# Patient Record
Sex: FEMALE | Race: WHITE | NOT HISPANIC OR LATINO | Employment: UNEMPLOYED | URBAN - NONMETROPOLITAN AREA
[De-identification: names, ages, dates, MRNs, and addresses within clinical notes are randomized per-mention and may not be internally consistent; named-entity substitution may affect disease eponyms.]

---

## 2023-01-10 ENCOUNTER — OFFICE VISIT (OUTPATIENT)
Dept: URGENT CARE | Facility: CLINIC | Age: 63
End: 2023-01-10

## 2023-01-10 VITALS
SYSTOLIC BLOOD PRESSURE: 126 MMHG | HEART RATE: 93 BPM | OXYGEN SATURATION: 97 % | RESPIRATION RATE: 16 BRPM | TEMPERATURE: 97.9 F | DIASTOLIC BLOOD PRESSURE: 62 MMHG

## 2023-01-10 DIAGNOSIS — R80.9 PROTEINURIA, UNSPECIFIED TYPE: Primary | ICD-10-CM

## 2023-01-10 LAB
SL AMB  POCT GLUCOSE, UA: NEGATIVE
SL AMB LEUKOCYTE ESTERASE,UA: NEGATIVE
SL AMB POCT BILIRUBIN,UA: NEGATIVE
SL AMB POCT BLOOD,UA: NORMAL
SL AMB POCT CLARITY,UA: CLEAR
SL AMB POCT COLOR,UA: NORMAL
SL AMB POCT KETONES,UA: NEGATIVE
SL AMB POCT NITRITE,UA: NEGATIVE
SL AMB POCT PH,UA: 6
SL AMB POCT SPECIFIC GRAVITY,UA: 1.01
SL AMB POCT URINE PROTEIN: 300
SL AMB POCT UROBILINOGEN: 0.2

## 2023-01-10 RX ORDER — ALBUTEROL SULFATE 90 UG/1
2 AEROSOL, METERED RESPIRATORY (INHALATION) EVERY 4 HOURS PRN
COMMUNITY
End: 2023-01-23 | Stop reason: SDUPTHER

## 2023-01-10 RX ORDER — PRAVASTATIN SODIUM 40 MG
40 TABLET ORAL DAILY
COMMUNITY
End: 2023-01-23 | Stop reason: SDUPTHER

## 2023-01-10 RX ORDER — CIPROFLOXACIN 500 MG/1
500 TABLET, FILM COATED ORAL EVERY 12 HOURS SCHEDULED
Qty: 10 TABLET | Refills: 0 | Status: SHIPPED | OUTPATIENT
Start: 2023-01-10 | End: 2023-01-15

## 2023-01-10 RX ORDER — CLONIDINE HYDROCHLORIDE 0.1 MG/1
TABLET ORAL
COMMUNITY
Start: 2022-11-04

## 2023-01-10 RX ORDER — ASPIRIN 81 MG/1
81 TABLET ORAL DAILY
COMMUNITY

## 2023-01-10 RX ORDER — DILTIAZEM HYDROCHLORIDE 240 MG/1
240 CAPSULE, COATED, EXTENDED RELEASE ORAL DAILY
COMMUNITY
Start: 2023-01-08

## 2023-01-10 NOTE — PROGRESS NOTES
[facility]        NAME: Chris Hand is a 60 y o  female  : 1960    MRN: [id number]  DATE: January 10, 2023  TIME: 7:37 PM    Assessment and Plan   Proteinuria, unspecified type [R80 9]  1  Proteinuria, unspecified type  POCT urine dip    Urine culture    ciprofloxacin (CIPRO) 500 mg tablet    Ambulatory Referral to Urology        Urine dip negative for infection- will send culture  Given proteinuria will cover with cipro to cover for pyleonephritis given long standing UTI symptoms and refer to urology  Patient Instructions   No sign of infection on the urine dip today  You do however have a lot of protein and blood in the urine that requires continued follow up  Therefore we will cover you for pyelonephritis with antibiotics  You have been prescribed an antibiotic  Take antibiotic as directed for the full duration  Do not stop the antibiotics just because you are feeling better  Side effects of antibiotics include diarrhea  Eat yogurt or take a probiotic or acidophilus tablet while taking this medication to help prevent diarrhea and replenish good gut bacteria  Follow up with PCP in 3-5 days  Proceed to  ER if symptoms worsen  Chief Complaint     Chief Complaint   Patient presents with   • Possible UTI     Started 1 month ago, urine dysuria         History of Present Illness       Patient is a 60year old female who presents to the office today for burning with urination and increased frequency  States symptoms have gotten worse over past month  States feels nauseated at times and fatigued  Review of Systems   Review of Systems   Constitutional: Negative for chills and fever  Genitourinary: Positive for dysuria, frequency and hematuria  Negative for decreased urine volume, dyspareunia, enuresis, menstrual problem, pelvic pain, urgency, vaginal bleeding and vaginal discharge  Skin: Negative for rash  All other systems reviewed and are negative          Current Medications       Current Outpatient Medications:   •  albuterol (PROVENTIL HFA,VENTOLIN HFA) 90 mcg/act inhaler, Inhale 2 puffs every 6 (six) hours as needed for wheezing, Disp: , Rfl:   •  aspirin (ECOTRIN LOW STRENGTH) 81 mg EC tablet, Take 81 mg by mouth daily, Disp: , Rfl:   •  ciprofloxacin (CIPRO) 500 mg tablet, Take 1 tablet (500 mg total) by mouth every 12 (twelve) hours for 5 days, Disp: 10 tablet, Rfl: 0  •  cloNIDine (CATAPRES) 0 1 mg tablet, take 1 tablet by mouth if needed for SPB >170, Disp: , Rfl:   •  diltiazem (CARDIZEM CD) 240 mg 24 hr capsule, Take 240 mg by mouth daily, Disp: , Rfl:   •  Multiple Vitamin (Multi-Vitamin) tablet, Take 1 tablet by mouth daily, Disp: , Rfl:   •  pravastatin (PRAVACHOL) 40 mg tablet, Take 40 mg by mouth daily, Disp: , Rfl:     Current Allergies     Allergies as of 01/10/2023 - Reviewed 01/10/2023   Allergen Reaction Noted   • Amoxicillin Rash 01/25/2022   • Shellfish allergy - food allergy Angioedema 01/09/2015   • Lisinopril Cough 01/10/2023            The following portions of the patient's history were reviewed and updated as appropriate: allergies, current medications, past family history, past medical history, past social history, past surgical history and problem list      Past Medical History:   Diagnosis Date   • Chronic kidney disease    • Emphysema of lung ([facility] [address] )    • Hypertension    • Osteopenia    • Rheumatoid arthritis ([facility] [address] )        History reviewed  No pertinent surgical history  History reviewed  No pertinent family history  Medications have been verified  Objective   /62   Pulse 93   Temp 97 9 °F (36 6 °C)   Resp 16   SpO2 97%        Physical Exam     Physical Exam  Vitals and nursing note reviewed  Constitutional:       General: She is not in acute distress  Appearance: Normal appearance  She is normal weight  She is not ill-appearing or toxic-appearing     Cardiovascular:      Rate and Rhythm: Normal rate and regular rhythm  Pulses: Normal pulses  Heart sounds: Normal heart sounds  Pulmonary:      Effort: Pulmonary effort is normal       Breath sounds: Normal breath sounds  Abdominal:      Tenderness: There is no right CVA tenderness or left CVA tenderness  Skin:     General: Skin is warm  Capillary Refill: Capillary refill takes less than 2 seconds  Neurological:      General: No focal deficit present  Mental Status: She is alert and oriented to person, place, and time

## 2023-01-11 NOTE — PATIENT INSTRUCTIONS
No sign of infection on the urine dip today  You do however have a lot of protein and blood in the urine that requires continued follow up  Therefore we will cover you for pyelonephritis with antibiotics  You have been prescribed an antibiotic  Take antibiotic as directed for the full duration  Do not stop the antibiotics just because you are feeling better  Side effects of antibiotics include diarrhea  Eat yogurt or take a probiotic or acidophilus tablet while taking this medication to help prevent diarrhea and replenish good gut bacteria

## 2023-01-12 LAB — BACTERIA UR CULT: ABNORMAL

## 2023-01-13 ENCOUNTER — TELEPHONE (OUTPATIENT)
Dept: URGENT CARE | Facility: CLINIC | Age: 63
End: 2023-01-13

## 2023-01-13 DIAGNOSIS — N30.01 ACUTE CYSTITIS WITH HEMATURIA: Primary | ICD-10-CM

## 2023-01-13 RX ORDER — NITROFURANTOIN 25; 75 MG/1; MG/1
100 CAPSULE ORAL 2 TIMES DAILY
Qty: 10 CAPSULE | Refills: 0 | Status: SHIPPED | OUTPATIENT
Start: 2023-01-13 | End: 2023-01-18

## 2023-01-13 NOTE — TELEPHONE ENCOUNTER
Patient called stating that she received a text message from the pharmacy and her name was spelled incorrectly so she was calling to verify her name was spelled in epic  Her name is spelled correctly through our system as verified by the patient

## 2023-01-13 NOTE — TELEPHONE ENCOUNTER
Patient returned phone call  Did discuss urine culture results as well as sensitivity  Patient verbalized understanding of change in antibiotics and is to follow-up with the urologist as originally planned

## 2023-01-13 NOTE — TELEPHONE ENCOUNTER
Left message for patient to call back regarding test results  Per urine culture patient's bacteria is resistant to Cipro therefore will place on Macrobid    Same sent to pharmacy

## 2023-01-17 ENCOUNTER — TELEPHONE (OUTPATIENT)
Dept: FAMILY MEDICINE CLINIC | Facility: CLINIC | Age: 63
End: 2023-01-17

## 2023-01-17 NOTE — TELEPHONE ENCOUNTER
Patient called inquiring about medical coverage due to recent visit at [facility]  Patient wanted to self pay for appointment but was told she had medical assistance  Patient was on the fence about canceling appointment but told her thaqt we are scheduling into the end of next week and to see if she contact the office tomorrow  Patient does not remember sending in papers for a renewal  Patient is not able to self pay with having that type of insurance   helped verify medical assistance  Also stated that when the panadamenic happened they were auto renewing which could have occurred for her  Called patient back to confirm appointment for Thursday 1/19 at 2:00PM or to cancel  Patient [place] the county office [place] and call us back to see if keeping appointment or not

## 2023-01-23 ENCOUNTER — OFFICE VISIT (OUTPATIENT)
Dept: FAMILY MEDICINE CLINIC | Facility: CLINIC | Age: 63
End: 2023-01-23

## 2023-01-23 VITALS
HEIGHT: 65 IN | TEMPERATURE: 96.1 F | OXYGEN SATURATION: 98 % | HEART RATE: 94 BPM | DIASTOLIC BLOOD PRESSURE: 98 MMHG | SYSTOLIC BLOOD PRESSURE: 156 MMHG | WEIGHT: 135.6 LBS | BODY MASS INDEX: 22.59 KG/M2

## 2023-01-23 DIAGNOSIS — N18.31 STAGE 3A CHRONIC KIDNEY DISEASE (HCC): ICD-10-CM

## 2023-01-23 DIAGNOSIS — Z13.29 SCREENING FOR THYROID DISORDER: ICD-10-CM

## 2023-01-23 DIAGNOSIS — Z11.4 SCREENING FOR HIV (HUMAN IMMUNODEFICIENCY VIRUS): ICD-10-CM

## 2023-01-23 DIAGNOSIS — Z11.59 ENCOUNTER FOR HEPATITIS C SCREENING TEST FOR LOW RISK PATIENT: ICD-10-CM

## 2023-01-23 DIAGNOSIS — R82.90 MALODOROUS URINE: ICD-10-CM

## 2023-01-23 DIAGNOSIS — E78.2 MIXED HYPERLIPIDEMIA: ICD-10-CM

## 2023-01-23 DIAGNOSIS — M06.9 RHEUMATOID ARTHRITIS, INVOLVING UNSPECIFIED SITE, UNSPECIFIED WHETHER RHEUMATOID FACTOR PRESENT (HCC): ICD-10-CM

## 2023-01-23 DIAGNOSIS — I10 PRIMARY HYPERTENSION: Primary | ICD-10-CM

## 2023-01-23 DIAGNOSIS — R30.0 DYSURIA: ICD-10-CM

## 2023-01-23 DIAGNOSIS — J43.9 PULMONARY EMPHYSEMA, UNSPECIFIED EMPHYSEMA TYPE (HCC): ICD-10-CM

## 2023-01-23 RX ORDER — LISINOPRIL 40 MG/1
40 TABLET ORAL DAILY
COMMUNITY
Start: 2022-04-26 | End: 2023-02-06 | Stop reason: ALTCHOICE

## 2023-01-23 RX ORDER — DILTIAZEM HYDROCHLORIDE 180 MG/1
240 CAPSULE, EXTENDED RELEASE ORAL DAILY
COMMUNITY
Start: 2022-03-24 | End: 2023-03-24

## 2023-01-23 RX ORDER — ATORVASTATIN CALCIUM 40 MG/1
40 TABLET, FILM COATED ORAL DAILY
COMMUNITY
Start: 2022-04-01 | End: 2023-02-06 | Stop reason: ALTCHOICE

## 2023-01-23 RX ORDER — PRAVASTATIN SODIUM 40 MG
40 TABLET ORAL DAILY
Qty: 30 TABLET | Refills: 0 | Status: SHIPPED | OUTPATIENT
Start: 2023-01-23

## 2023-01-23 RX ORDER — SERTRALINE HYDROCHLORIDE 25 MG/1
25 TABLET, FILM COATED ORAL DAILY
COMMUNITY
Start: 2022-03-24 | End: 2023-02-06 | Stop reason: ALTCHOICE

## 2023-01-23 RX ORDER — ATENOLOL 25 MG/1
25 TABLET ORAL DAILY
COMMUNITY
Start: 2022-05-10 | End: 2023-02-06 | Stop reason: ALTCHOICE

## 2023-01-23 RX ORDER — DILTIAZEM HYDROCHLORIDE 180 MG/1
180 CAPSULE, EXTENDED RELEASE ORAL DAILY
COMMUNITY
Start: 2023-01-19

## 2023-01-23 RX ORDER — ALBUTEROL SULFATE 90 UG/1
2 AEROSOL, METERED RESPIRATORY (INHALATION) EVERY 4 HOURS PRN
Qty: 18 G | Refills: 1 | Status: SHIPPED | OUTPATIENT
Start: 2023-01-23

## 2023-01-23 NOTE — PATIENT INSTRUCTIONS
Patient is to start recording blood pressures once in the morning prior to taking any medications, thereafter she will take blood pressure once at night after she has taken medications for that day  Patient will bring in pressure log for review with PCP in 2 weeks

## 2023-01-23 NOTE — PROGRESS NOTES
Assessment/Plan:    Patient is a 61 yo F with pmhx of HTN, HLD, RA, CKD and Emphysema who presents to establish care  Regarding hypertension patient with notably labile blood pressures  She reports symptoms of headaches and blurred vision specifically when systolic blood pressure ranges 120-125mmHg  Upon review of current medication, PCP discussed with patient that clonidine has side effect of rebound hypertension and may be contributing to labile blood pressures  Will obtain baseline blood work and have patient record ambulatory blood pressures once in the morning prior to medication administration and once in the afternoon after all medication administration  PCP will review blood pressure log and lab work at next visit and recommend antihypertensive regiment changes at that time  No problem-specific Assessment & Plan notes found for this encounter  Diagnoses and all orders for this visit:    Primary hypertension  -     CBC and differential; Future  -     Comprehensive metabolic panel; Future  -     Hemoglobin A1C; Future  -     pravastatin (PRAVACHOL) 40 mg tablet; Take 1 tablet (40 mg total) by mouth daily    Mixed hyperlipidemia  -     CBC and differential; Future  -     Comprehensive metabolic panel; Future  -     Lipid panel; Future  -     Hemoglobin A1C; Future  -     pravastatin (PRAVACHOL) 40 mg tablet; Take 1 tablet (40 mg total) by mouth daily    Rheumatoid arthritis, involving unspecified site, unspecified whether rheumatoid factor present ([facility] [address] )  -     Continue close follow-up with rheumatology, patient reports she has never been on disease modifying antirheumatic medications for rheumatoid arthritis  -PCP will provide referral to new rheumatologist if required for further evaluation and management of above  -     CBC and differential; Future  -     Comprehensive metabolic panel; Future    Screening for thyroid disorder  -     T4, free; Future  -     TSH, 3rd generation;  Future    Encounter for hepatitis C screening test for low risk patient  -     Hepatitis C antibody; Future    Screening for HIV (human immunodeficiency virus)  -     HIV 1/2 AG/AB w Reflex [facility] for 2 yr old and above; Future    Malodorous urine  - Will obtain repeat urine culture to ensure resolution of previous UTI noted on 1/10/2023 in the setting of continued symptoms despite antibiotic therapy          -Urine culture; Future    Dysuria  -Will obtain repeat urine culture to ensure resolution of previous UTI noted on 1/10/2023 in the setting of continued symptoms despite antibiotic therapy      - Urine culture; Future    Pulmonary emphysema, unspecified emphysema type (HCC)  -     albuterol (PROVENTIL HFA,VENTOLIN HFA) 90 mcg/act inhaler; Inhale 2 puffs every 4 (four) hours as needed for wheezing    Stage 3a chronic kidney disease (HCC)        Subjective:      Patient ID: Crystal Houston is a 60 y o  female  Patient is a 70-year-old female with past medical history significant of hypertension, hyperlipidemia, rheumatoid arthritis, chronic kidney disease and emphysema who presents to [facility] care  Patient reports significant concern for uncontrolled blood pressures at this time  Patient reports she will monitor blood pressures multiple times a day and relays that lowest is 115/77 while max is 177/112  Patient reports she experiences about 3-4 episodes of headaches with transiently blurred vision especially when the systolic blood pressures are in the 120-125 range  Patient also reports that a couple weeks ago she experienced episodes of irregular heartbeats  Upon looking at her BP monitor she noted approximately 4 episodes of irregular heartbeat  Patient reports this occurred more so when she was lying on her left side  Most recent lab work noted 6/2022 GFR 59 G3a    Patient does report previous evaluation with nephrology and believes at some point in time she did undergo kidney biopsy but is unsure of results at this time  Regarding hypertension reports significant concern as blood pressures have been labile  Patient reports that at this time she uses clonidine about 3-4 times per week since 11/2022      Recent UTI  Patient also reports that approximately 2 weeks ago she developed dysuria and malodorous urine  She reports she presented to [place] [address] urgent care and was started on ciprofloxacin presumed UTI  Urine culture collected at that time showed UTI consistent with E  Coli  However, given notable resistance to ciprofloxacin, ampicillin, Bactrim, levofloxacin, tobramycin urgent care provider reach out to patient and antibiotic regimen was switched to Macrobid at that time  At today's visit patient reports she continues to notice some malodor to the urine but denies any robi hematuria  Patient denies any fevers, chills, nausea or vomiting at this time      The following portions of the patient's history were reviewed and updated as appropriate: allergies, current medications, past family history, past medical history, past social history, past surgical history and problem list     Review of Systems   Constitutional: Negative for unexpected weight change  HENT: Negative for hearing loss  Eyes:        Transient blurred vision   Cardiovascular: Negative for chest pain  Irregular heartbeat sensation   Gastrointestinal: Negative for nausea and vomiting  Genitourinary:        Malodorous urine   Neurological: Positive for headaches  Objective:      /98 (BP Location: Right arm)   Pulse 94   Temp (!) 96 1 °F (35 6 °C) (Tympanic)   Ht 5' 5 4" (1 661 m)   Wt 61 5 kg (135 lb 9 6 oz)   SpO2 98%   BMI 22 29 kg/m²          Physical Exam  Constitutional:       Appearance: She is well-developed  HENT:      Head: Normocephalic and atraumatic        Right Ear: Tympanic membrane, ear canal and external ear normal       Left Ear: Tympanic membrane, ear canal and external ear normal       Nose: Nose normal  Mouth/Throat:      Mouth: Mucous membranes are moist       Pharynx: Oropharynx is clear  Eyes:      Conjunctiva/sclera: Conjunctivae normal    Cardiovascular:      Rate and Rhythm: Normal rate and regular rhythm  Heart sounds: Normal heart sounds  Pulmonary:      Effort: Pulmonary effort is normal       Breath sounds: Normal breath sounds  Abdominal:      General: Bowel sounds are normal       Palpations: Abdomen is soft  Musculoskeletal:         General: Normal range of motion  Cervical back: Normal range of motion and neck supple  Skin:     General: Skin is warm and dry  Neurological:      Mental Status: She is alert and oriented to person, place, and time     Psychiatric:         Mood and Affect: Mood normal          Behavior: Behavior normal

## 2023-01-26 ENCOUNTER — APPOINTMENT (OUTPATIENT)
Dept: LAB | Facility: CLINIC | Age: 63
End: 2023-01-26

## 2023-01-26 DIAGNOSIS — Z13.29 SCREENING FOR THYROID DISORDER: ICD-10-CM

## 2023-01-26 DIAGNOSIS — M06.9 RHEUMATOID ARTHRITIS, INVOLVING UNSPECIFIED SITE, UNSPECIFIED WHETHER RHEUMATOID FACTOR PRESENT (HCC): ICD-10-CM

## 2023-01-26 DIAGNOSIS — R82.90 MALODOROUS URINE: ICD-10-CM

## 2023-01-26 DIAGNOSIS — E78.2 MIXED HYPERLIPIDEMIA: ICD-10-CM

## 2023-01-26 DIAGNOSIS — Z11.4 SCREENING FOR HIV (HUMAN IMMUNODEFICIENCY VIRUS): ICD-10-CM

## 2023-01-26 DIAGNOSIS — Z11.59 ENCOUNTER FOR HEPATITIS C SCREENING TEST FOR LOW RISK PATIENT: ICD-10-CM

## 2023-01-26 DIAGNOSIS — R30.0 DYSURIA: ICD-10-CM

## 2023-01-26 DIAGNOSIS — I10 PRIMARY HYPERTENSION: ICD-10-CM

## 2023-01-26 LAB
ALBUMIN SERPL BCP-MCNC: 3.9 G/DL (ref 3.5–5)
ALP SERPL-CCNC: 87 U/L (ref 46–116)
ALT SERPL W P-5'-P-CCNC: 25 U/L (ref 12–78)
ANION GAP SERPL CALCULATED.3IONS-SCNC: 3 MMOL/L (ref 4–13)
AST SERPL W P-5'-P-CCNC: 16 U/L (ref 5–45)
BASOPHILS # BLD AUTO: 0.09 THOUSANDS/ÂΜL (ref 0–0.1)
BASOPHILS NFR BLD AUTO: 1 % (ref 0–1)
BILIRUB SERPL-MCNC: 0.41 MG/DL (ref 0.2–1)
BUN SERPL-MCNC: 15 MG/DL (ref 5–25)
CALCIUM SERPL-MCNC: 10.3 MG/DL (ref 8.3–10.1)
CHLORIDE SERPL-SCNC: 108 MMOL/L (ref 96–108)
CHOLEST SERPL-MCNC: 164 MG/DL
CO2 SERPL-SCNC: 28 MMOL/L (ref 21–32)
CREAT SERPL-MCNC: 1.11 MG/DL (ref 0.6–1.3)
EOSINOPHIL # BLD AUTO: 0.15 THOUSAND/ÂΜL (ref 0–0.61)
EOSINOPHIL NFR BLD AUTO: 2 % (ref 0–6)
ERYTHROCYTE [DISTWIDTH] IN BLOOD BY AUTOMATED COUNT: 12.1 % (ref 11.6–15.1)
EST. AVERAGE GLUCOSE BLD GHB EST-MCNC: 111 MG/DL
GFR SERPL CREATININE-BSD FRML MDRD: 52 ML/MIN/1.73SQ M
GLUCOSE P FAST SERPL-MCNC: 94 MG/DL (ref 65–99)
HBA1C MFR BLD: 5.5 %
HCT VFR BLD AUTO: 44.7 % (ref 34.8–46.1)
HCV AB SER QL: NORMAL
HDLC SERPL-MCNC: 54 MG/DL
HGB BLD-MCNC: 14.3 G/DL (ref 11.5–15.4)
HIV 1+2 AB+HIV1 P24 AG SERPL QL IA: NORMAL
HIV 2 AB SERPL QL IA: NORMAL
HIV1 AB SERPL QL IA: NORMAL
HIV1 P24 AG SERPL QL IA: NORMAL
IMM GRANULOCYTES # BLD AUTO: 0.02 THOUSAND/UL (ref 0–0.2)
IMM GRANULOCYTES NFR BLD AUTO: 0 % (ref 0–2)
LDLC SERPL CALC-MCNC: 74 MG/DL (ref 0–100)
LYMPHOCYTES # BLD AUTO: 3.19 THOUSANDS/ÂΜL (ref 0.6–4.47)
LYMPHOCYTES NFR BLD AUTO: 36 % (ref 14–44)
MCH RBC QN AUTO: 31.2 PG (ref 26.8–34.3)
MCHC RBC AUTO-ENTMCNC: 32 G/DL (ref 31.4–37.4)
MCV RBC AUTO: 98 FL (ref 82–98)
MONOCYTES # BLD AUTO: 0.74 THOUSAND/ÂΜL (ref 0.17–1.22)
MONOCYTES NFR BLD AUTO: 8 % (ref 4–12)
NEUTROPHILS # BLD AUTO: 4.76 THOUSANDS/ÂΜL (ref 1.85–7.62)
NEUTS SEG NFR BLD AUTO: 53 % (ref 43–75)
NONHDLC SERPL-MCNC: 110 MG/DL
NRBC BLD AUTO-RTO: 0 /100 WBCS
PLATELET # BLD AUTO: 269 THOUSANDS/UL (ref 149–390)
PMV BLD AUTO: 11.9 FL (ref 8.9–12.7)
POTASSIUM SERPL-SCNC: 4 MMOL/L (ref 3.5–5.3)
PROT SERPL-MCNC: 7.6 G/DL (ref 6.4–8.4)
RBC # BLD AUTO: 4.58 MILLION/UL (ref 3.81–5.12)
SODIUM SERPL-SCNC: 139 MMOL/L (ref 135–147)
T4 FREE SERPL-MCNC: 1.04 NG/DL (ref 0.76–1.46)
TRIGL SERPL-MCNC: 180 MG/DL
TSH SERPL DL<=0.05 MIU/L-ACNC: 4.28 UIU/ML (ref 0.45–4.5)
WBC # BLD AUTO: 8.95 THOUSAND/UL (ref 4.31–10.16)

## 2023-01-27 LAB — BACTERIA UR CULT: ABNORMAL

## 2023-02-03 ENCOUNTER — TELEPHONE (OUTPATIENT)
Dept: FAMILY MEDICINE CLINIC | Facility: CLINIC | Age: 63
End: 2023-02-03

## 2023-02-06 ENCOUNTER — OFFICE VISIT (OUTPATIENT)
Dept: FAMILY MEDICINE CLINIC | Facility: CLINIC | Age: 63
End: 2023-02-06

## 2023-02-06 VITALS
SYSTOLIC BLOOD PRESSURE: 144 MMHG | OXYGEN SATURATION: 95 % | HEART RATE: 103 BPM | BODY MASS INDEX: 22.79 KG/M2 | WEIGHT: 136.8 LBS | TEMPERATURE: 96.7 F | DIASTOLIC BLOOD PRESSURE: 92 MMHG | HEIGHT: 65 IN

## 2023-02-06 DIAGNOSIS — N18.31 STAGE 3A CHRONIC KIDNEY DISEASE (CKD) (HCC): ICD-10-CM

## 2023-02-06 DIAGNOSIS — Z23 ENCOUNTER FOR ADMINISTRATION OF VACCINE: ICD-10-CM

## 2023-02-06 DIAGNOSIS — I10 PRIMARY HYPERTENSION: ICD-10-CM

## 2023-02-06 DIAGNOSIS — R00.2 PALPITATIONS: Primary | ICD-10-CM

## 2023-02-06 DIAGNOSIS — F41.9 ANXIETY: ICD-10-CM

## 2023-02-06 RX ORDER — NIFEDIPINE 10 MG/1
10 CAPSULE ORAL 3 TIMES DAILY
Qty: 30 CAPSULE | Refills: 1 | Status: SHIPPED | OUTPATIENT
Start: 2023-02-06 | End: 2023-02-17 | Stop reason: ALTCHOICE

## 2023-02-06 RX ORDER — ESCITALOPRAM OXALATE 10 MG/1
10 TABLET ORAL DAILY
Qty: 90 TABLET | Refills: 1 | Status: SHIPPED | OUTPATIENT
Start: 2023-02-06

## 2023-02-06 NOTE — PROGRESS NOTES
Assessment/Plan:    Patient returns for follow up of HTN, over 2 weeks had about 5 episodes    over elevated -170/90-100s  After review of medications, PCP has counseled patient to discontinue clonidine at this and will trial on nifedipine as needed for BP > 170/100s  Patient is to call PCP if she has symptoms of chest pain/pressure or elevated BPs that does not respond to medications  Patient also with worsening anxiety, will trial on Lexapro 10mg at this time  Will re-evaluate effects after 6-8 weeks on pharmacotherapy  No problem-specific Assessment & Plan notes found for this encounter  Diagnoses and all orders for this visit:    Palpitations  -     AMB extended holter monitor; Future    Stage 3a chronic kidney disease (CKD) ([facility] [address] )  - Patient with known hx of CKD, previously seen by Nephrology  - GFR at baseline for now, continue to monitor and encourage BP control and healthy diet    Primary hypertension  -     NIFEdipine (PROCARDIA) 10 mg capsule; Take 1 capsule (10 mg total) by mouth 3 (three) times a day  -     AMB extended holter monitor; Future    Encounter for administration of vaccine  -     Pneumococcal Conjugate Vaccine 20-valent (Pcv20)    Anxiety  -     escitalopram (Lexapro) 10 mg tablet; Take 1 tablet (10 mg total) by mouth daily        Subjective:      Patient ID: Emily Alas is a 60 y o  female  Patient returns for review of blood pressure readings, patient had 5 readings with BP ranging 160-170/90-100s over the last 2 weeks  Patient reports she did take clonidine during these episodes  Patient recalls during one of the episodes having a severe headache upon awakening   She also admits that she continues to experience significant anxiety due to uncontrolled BP which can also contribute to elevated BP  Patient reports she continues to experience intermittent palpitations, but has not experienced SOB, syncope or presyncope  PCP review lab results with patient, and noted overall labs were stable  Blood counts, liver and thyroid function are normal  Hep C and HIV were negative  Cholesterol levels were appropriate  Hemoglobin A1C was 5 5 in the normal range  Stage 3 A kidney disease noted which patient has at baseline  Additionally, urine culture only showed colonization with gram neg bacteria likely E coli without infection       Anxiety  Presents for initial visit  Onset was 1 to 5 years ago  The problem has been gradually worsening  Symptoms include excessive worry, nervous/anxious behavior and palpitations  Patient reports no depressed mood, nausea, panic, shortness of breath or suicidal ideas  The severity of symptoms is moderate  Exacerbated by: Stress regarding health conditions  Risk factors include a major life event (Patient does report the loss of 2 children ( 4 month old and 25year old) during today's visit )  There is no history of CAD or suicide attempts  Past treatments include SSRIs (Patient reports she previously was trialed on Zoloft, but this caused more brain fog  Thus, patient decided to discontinue this medication  )  Prior compliance problems include medication issues  The following portions of the patient's history were reviewed and updated as appropriate: allergies, current medications, past family history, past medical history, past social history, past surgical history and problem list     Review of Systems   Eyes: Negative for visual disturbance  Respiratory: Negative for chest tightness and shortness of breath  Cardiovascular: Positive for palpitations  Gastrointestinal: Negative for nausea and vomiting  Psychiatric/Behavioral: Negative for suicidal ideas  The patient is nervous/anxious  Objective:      /92   Pulse 103   Temp (!) 96 7 °F (35 9 °C)   Ht 5' 5 4" (1 661 m)   Wt 62 1 kg (136 lb 12 8 oz)   SpO2 95%   BMI 22 49 kg/m²          Physical Exam  Constitutional:       Appearance: She is well-developed     HENT: Head: Normocephalic and atraumatic  Nose: Nose normal    Eyes:      Conjunctiva/sclera: Conjunctivae normal    Cardiovascular:      Rate and Rhythm: Regular rhythm  Tachycardia present  Heart sounds: Normal heart sounds  Pulmonary:      Effort: Pulmonary effort is normal       Breath sounds: Normal breath sounds  Abdominal:      General: Bowel sounds are normal       Palpations: Abdomen is soft  Musculoskeletal:         General: Normal range of motion  Cervical back: Normal range of motion and neck supple  Skin:     General: Skin is warm and dry  Neurological:      Mental Status: She is alert and oriented to person, place, and time

## 2023-02-06 NOTE — PATIENT INSTRUCTIONS
Chronic Kidney Disease   WHAT YOU NEED TO KNOW:   Chronic kidney disease (CKD) is the gradual and permanent loss of kidney function  It is also called chronic kidney failure, or chronic renal insufficiency  Normally, the kidneys remove fluid, chemicals, and waste from your blood  These wastes are turned into urine by your kidneys  CKD may worsen over time and lead to kidney failure  Your CKD team will help you and your family plan for your care at home  The team will help you create goals and find ways to meet your goals  Your care plan may change over time as your needs change  DISCHARGE INSTRUCTIONS:   Call your local emergency number (911 in the [address]) if:   You have a seizure  You have shortness of breath  Return to the emergency department if:   You are confused and very drowsy  Call your doctor or nephrologist if:   You suddenly gain or lose more weight than your healthcare provider has told you is okay  You have itchy skin or a rash  You urinate more or less than you normally do  You have blood in your urine  You have nausea and are vomiting  You have fatigue or muscle weakness  You have hiccups that will not stop  You have questions or concerns about your condition or care  Medicines:   Medicines  may be given to decrease blood pressure and get rid of extra fluid  You may also receive medicine to manage health conditions that may occur with CKD, such as anemia, diabetes, and heart disease  Take your medicine as directed  Contact your healthcare provider if you think your medicine is not helping or if you have side effects  Tell him or her if you are allergic to any medicine  Keep a list of the medicines, vitamins, and herbs you take  Include the amounts, and when and why you take them  Bring the list or the pill bottles to follow-up visits  Carry your medicine list with you in case of an emergency  What you can do to manage CKD:   Management may include making some lifestyle changes  Tell your healthcare provider if you have any concerns about being able to make changes  He or she can help you find solutions, including working with specialists  Ask for help creating a plan to break large goals into smaller steps  Your plan may include any of the following:  Manage other health conditions  Your healthcare provider will work with you to make a care plan that meets your needs  You will be checked regularly for heart disease or other conditions that can make CKD worse, such as diabetes  Your blood pressure will be closely monitored  You will also get a target blood pressure and help making a plan to reach your target  This may include taking your blood pressure at home  Maintain a healthy weight  Your weight and body mass index (BMI) will be checked regularly  BMI helps find if your weight is healthy for your height  Your healthcare provider will use other tests to check your muscle and protein levels  Extra weight can strain your kidneys  A low weight or low muscle mass can make you feel more tired  You may have trouble doing your daily activities  Ask your provider what a healthy weight is for you  He or she can help you create a plan to lose or gain weight safely, if needed  The plan may include keeping a food diary  This is a list of foods and liquids you have each day  Your provider will use the diary to help you make changes, if needed  Changes are based on your health and any other conditions you have, such as diabetes  Create an exercise plan  Regular exercise can help you manage CKD, high blood pressure, and diabetes  Exercise also helps control weight  Your provider can help you create exercise goals and a plan to reach those goals  For example, your goal may be to exercise for 30 minutes in a day  Your plan can include breaking exercise into 10 minute sessions, 3 times during the day  Create a healthy eating plan    Your provider may tell you to eat food low in potassium, phosphorus, or protein  Your provider may also recommend vitamin or mineral supplements  Do not take any supplements without talking to your provider  A dietitian can help you plan meals if needed  Ask how much liquid to drink each day and which liquids are best for you  Limit sodium (salt) as directed  You may need to limit sodium to less than 2,300 milligrams (mg) each day  Ask your dietitian or healthcare provider how much sodium you can have each day  The amount depends on your stage of kidney disease  Table salt, canned foods, soups, salted snacks, and processed meats, like deli meats and sausage, are high in sodium  Your provider or a dietitian can show you how to read food labels for sodium  Limit alcohol as directed  Alcohol can cause fluid retention and can affect your kidneys  Ask how much alcohol is safe for you  A drink of alcohol is 12 ounces of beer, 5 ounces of wine, or 1½ ounces of liquor  Do not smoke  Nicotine and other chemicals in cigarettes and cigars can cause kidney damage  Ask your provider for information if you currently smoke and need help to quit  E-cigarettes or smokeless tobacco still contain nicotine  Talk to your provider before you use these products  Ask about over-the-counter medicines  Medicines such as NSAIDs and laxatives may harm your kidneys  Some cough and cold medicines can raise your blood pressure  Always ask if a medicine is safe before you take it  Ask about vaccines you may need  CKD can increase your risk for infections such as pneumonia, influenza, and hepatitis  Vaccines lower your risk for infection  Your healthcare provider will tell you which vaccines you need and when to get them  Follow up with your doctor or nephrologist as directed: You will need to return for tests to monitor your kidney and nerve function, and your parathyroid hormone level   Your medicines may be changed, based on certain test results  Write down your questions so you remember to ask them during your visits  © Copyright [address] 2022 Information is for End User's use only and may not be sold, redistributed or otherwise used for commercial purposes  All illustrations and images included in CareNotes® are the copyrighted property of [employer]  or Mera Sotomayor  The above information is an  only  It is not intended as medical advice for individual conditions or treatments  Talk to your doctor, nurse or pharmacist before following any medical regimen to see if it is safe and effective for you

## 2023-02-08 ENCOUNTER — TELEPHONE (OUTPATIENT)
Dept: FAMILY MEDICINE CLINIC | Facility: CLINIC | Age: 63
End: 2023-02-08

## 2023-02-09 NOTE — TELEPHONE ENCOUNTER
PCP called patient back to discuss confusion regarding instructions for medications  PCP reviewed that she is to continue her diltiazem once in the morning and once in the afternoon  Additionally, since patient was asked to stop clonidine all together, she will use nifedipine as needed for acute high blood pressure > 170/100  PCP did review with patient that her AVS does reflect this changes  Patient states she was concerned as she thought she was told to stop by diltiazem,but PCP clarified that she should definitely continue these for now  Patient reports she is unsure of where her AVS is, but inquired about ability to see instructions online  PCP then provided instructions to log on to [employer], look under appointments to previous visits and there she would be able to find both my note with recommendations and her AVS as well

## 2023-02-14 ENCOUNTER — TELEPHONE (OUTPATIENT)
Dept: FAMILY MEDICINE CLINIC | Facility: CLINIC | Age: 63
End: 2023-02-14

## 2023-02-17 ENCOUNTER — OFFICE VISIT (OUTPATIENT)
Dept: FAMILY MEDICINE CLINIC | Facility: CLINIC | Age: 63
End: 2023-02-17

## 2023-02-17 VITALS
SYSTOLIC BLOOD PRESSURE: 142 MMHG | TEMPERATURE: 95 F | HEART RATE: 75 BPM | WEIGHT: 136 LBS | OXYGEN SATURATION: 96 % | DIASTOLIC BLOOD PRESSURE: 86 MMHG | HEIGHT: 65 IN | BODY MASS INDEX: 22.66 KG/M2

## 2023-02-17 DIAGNOSIS — I10 PRIMARY HYPERTENSION: Primary | ICD-10-CM

## 2023-02-17 NOTE — PROGRESS NOTES
Assessment/Plan:    No problem-specific Assessment & Plan notes found for this encounter  Diagnoses and all orders for this visit:    Primary hypertension     -Patient tolerated wean off clonidine with only 1 episode of hypertension 175/85 noted on 2/14, patient took nifedipine 10 mg at that time and did report she became hypotensive  -Review of BP log showing blood pressures are stable hence PCP counseled patient that we will monitor blood pressure on diltiazem twice daily at this time  -PCP counseled patient that she may discontinue nifedipine altogether moving forward    Subjective:      Patient ID: Mini Leavitt is a 60 y o  female  Patient presents for follow-up of hypertension after clonidine discontinuation with as needed use of nifedipine 10 mg  Blood pressure log showing BP stable ranging between 125-150s/80s-90s  Patient did have 1 episode of hypertension with SBP of 175 at that time patient took nifedipine and report approximately 1 hour later blood pressure had decreased to 108/63  Patient reports at that time she developed headache, nausea, lightheadedness, flushing sensation, shortness of, and fluttering sensation and since then discontinued medication  PCP counseled patient that nifedipine only used for this 2 week trial from weaning clonidine as PCP had concerns for rebound hypertension  Discussed with patient at length that given stable blood pressure she may continue on diltiazem twice daily at this time      The following portions of the patient's history were reviewed and updated as appropriate: allergies, current medications, past family history, past medical history, past social history, past surgical history and problem list     Review of Systems   Respiratory: Negative for chest tightness and shortness of breath  Cardiovascular: Positive for palpitations  Gastrointestinal: Negative for nausea and vomiting  Neurological: Negative for dizziness and light-headedness  Objective:      /86   Pulse 75   Temp (!) 95 °F (35 °C)   Ht 5' 5 4" (1 661 m)   Wt 61 7 kg (136 lb)   SpO2 96%   BMI 22 36 kg/m²          Physical Exam  Constitutional:       Appearance: She is well-developed  HENT:      Head: Normocephalic and atraumatic  Nose: Nose normal    Eyes:      Conjunctiva/sclera: Conjunctivae normal    Cardiovascular:      Rate and Rhythm: Normal rate and regular rhythm  Heart sounds: Normal heart sounds  Pulmonary:      Effort: Pulmonary effort is normal       Breath sounds: Normal breath sounds  Abdominal:      General: Bowel sounds are normal       Palpations: Abdomen is soft  Musculoskeletal:         General: Normal range of motion  Cervical back: Normal range of motion and neck supple  Skin:     General: Skin is warm and dry  Neurological:      Mental Status: She is alert and oriented to person, place, and time

## 2023-02-27 DIAGNOSIS — I10 PRIMARY HYPERTENSION: ICD-10-CM

## 2023-02-27 DIAGNOSIS — E78.2 MIXED HYPERLIPIDEMIA: ICD-10-CM

## 2023-02-27 RX ORDER — PRAVASTATIN SODIUM 40 MG
40 TABLET ORAL DAILY
Qty: 30 TABLET | Refills: 0 | Status: SHIPPED | OUTPATIENT
Start: 2023-02-27 | End: 2023-02-27

## 2023-02-27 RX ORDER — PRAVASTATIN SODIUM 40 MG
40 TABLET ORAL DAILY
Qty: 90 TABLET | Refills: 1 | Status: SHIPPED | OUTPATIENT
Start: 2023-02-27

## 2023-02-27 NOTE — TELEPHONE ENCOUNTER
Voicemail:  Hi, my name is Corry Sweat  My phone number is [phone]  I need a prescription call called in to [facility] on [address]  AND [facility]  It's pravastatin sodium, 40 milligrams and a 90 day supply please   Thank you

## 2023-03-14 ENCOUNTER — TELEPHONE (OUTPATIENT)
Dept: FAMILY MEDICINE CLINIC | Facility: CLINIC | Age: 63
End: 2023-03-14

## 2023-03-14 NOTE — TELEPHONE ENCOUNTER
I left message on voicemail to remind patient of appointment on Thursday @2:30  Call us back if you need to reschedule option 1

## 2023-08-28 NOTE — TELEPHONE ENCOUNTER
Meek Moraes called to confirm her appointment for tomorrow  When I inquired about her preference confirming her coverage directly with P, she stated she had not been able to confirm her coverage  I then informed her that in both the Epic system and in the [place] system she was being shown as eligible for her GHP coverage  I explained that [facility] cannot offer self-pay to patients who have medical assistance  She stated she has been able to do self-pay at other facilities  I shared that [facility] is a non-profit and discounted rates are offered but if a patient has medical assistance, they must use it for their visit coverage  Patient stated she does not feel comfortable using the [facility] insurance since she does not believe she has the coverage  We cancelled her appointment for tomorrow, and I offered her to give our office a call if she's able to confirm her coverage and feels comfortable using the insurance for her visits here  all other ROS negative except as per HPI

## 2023-10-09 ENCOUNTER — OFFICE VISIT (OUTPATIENT)
Dept: URGENT CARE | Facility: CLINIC | Age: 63
End: 2023-10-09
Payer: COMMERCIAL

## 2023-10-09 VITALS — HEART RATE: 93 BPM | TEMPERATURE: 97.9 F | RESPIRATION RATE: 18 BRPM | OXYGEN SATURATION: 98 %

## 2023-10-09 DIAGNOSIS — K04.7 DENTAL ABSCESS: Primary | ICD-10-CM

## 2023-10-09 PROCEDURE — S9088 SERVICES PROVIDED IN URGENT: HCPCS | Performed by: STUDENT IN AN ORGANIZED HEALTH CARE EDUCATION/TRAINING PROGRAM

## 2023-10-09 PROCEDURE — 99213 OFFICE O/P EST LOW 20 MIN: CPT | Performed by: STUDENT IN AN ORGANIZED HEALTH CARE EDUCATION/TRAINING PROGRAM

## 2023-10-09 RX ORDER — CLINDAMYCIN HYDROCHLORIDE 300 MG/1
300 CAPSULE ORAL 4 TIMES DAILY
Qty: 28 CAPSULE | Refills: 0 | Status: SHIPPED | OUTPATIENT
Start: 2023-10-09 | End: 2023-10-16

## 2023-10-09 NOTE — PATIENT INSTRUCTIONS
Dental Abscess   WHAT YOU NEED TO KNOW:   A dental abscess is a collection of pus in or around a tooth. A dental abscess is caused by bacteria. The bacteria can enter the tooth when the enamel (outer part of the tooth) is damaged by tooth decay. Bacteria can also enter the tooth through a chip in the tooth or a cut in the gum. Food particles that are stuck between the teeth for a long time may also lead to an abscess. DISCHARGE INSTRUCTIONS:   Return to the emergency department if:   You have severe pain in your tooth or jaw. You have trouble breathing because of pain or swelling. Call your doctor if:   Your symptoms get worse, even after treatment. Your mouth is bleeding. You cannot eat or drink because of pain or swelling. Your abscess returns. You have an injury that causes a crack in your tooth. You have questions or concerns about your condition or care. Medicines: You may  need any of the following:  Antibiotics  help treat a bacterial infection. NSAIDs , such as ibuprofen, help decrease swelling, pain, and fever. This medicine is available with or without a doctor's order. NSAIDs can cause stomach bleeding or kidney problems in certain people. If you take blood thinner medicine, always ask your healthcare provider if NSAIDs are safe for you. Always read the medicine label and follow directions. Acetaminophen  decreases pain and fever. It is available without a doctor's order. Ask how much to take and how often to take it. Follow directions. Read the labels of all other medicines you are using to see if they also contain acetaminophen, or ask your doctor or pharmacist. Acetaminophen can cause liver damage if not taken correctly. Prescription pain medicine  may be given. Ask your healthcare provider how to take this medicine safely. Some prescription pain medicines contain acetaminophen.  Do not take other medicines that contain acetaminophen without talking to your healthcare provider. Too much acetaminophen may cause liver damage. Prescription pain medicine may cause constipation. Ask your healthcare provider how to prevent or treat constipation. Take your medicine as directed. Contact your healthcare provider if you think your medicine is not helping or if you have side effects. Tell your provider if you are allergic to any medicine. Keep a list of the medicines, vitamins, and herbs you take. Include the amounts, and when and why you take them. Bring the list or the pill bottles to follow-up visits. Carry your medicine list with you in case of an emergency. Self-care:   Rinse your mouth every 2 hours with salt water. This will help keep the area clean. Gently brush your teeth twice a day with a soft tooth brush. This will help keep the area clean. Eat soft foods as directed. Soft foods may cause less pain. Examples include applesauce, yogurt, and cooked pasta. Ask your healthcare provider how long to follow this instruction. Apply a warm compress to your tooth or gum. Use a cotton ball or gauze soaked in warm water. Remove the compress in 10 minutes or when it becomes cool. Repeat 3 times a day. Prevent another abscess:   Brush your teeth at least 2 times a day  with fluoride toothpaste. Use dental floss at least once a day  to clean between your teeth. Rinse your mouth with water or mouthwash  after meals and snacks. Chew sugarless gum. Avoid sugary and starchy food that can stick between your teeth. Limit drinks high in sugar, such as soda or fruit juice. See your dentist every 6 months  for dental cleanings and oral exams. Follow up with your doctor or dentist in 24 hours, or as directed: Your healthcare provider will need to check your teeth and gums. Write down your questions so you remember to ask them during your visits.    © Copyright Curtis Jaeger 2023 Information is for End User's use only and may not be sold, redistributed or otherwise used for commercial purposes. The above information is an  only. It is not intended as medical advice for individual conditions or treatments. Talk to your doctor, nurse or pharmacist before following any medical regimen to see if it is safe and effective for you.

## 2023-10-09 NOTE — PROGRESS NOTES
[facility]        NAME: Zuhair Romo is a 58 y.o. female  : 1960    MRN: [id number]  DATE: 2023  TIME: 3:05 PM    Assessment and Plan   Dental abscess [K04.7]  1. Dental abscess  clindamycin (CLEOCIN) 300 MG capsule    Ambulatory Referral to Dentistry        Allergic to amoxicillin so will tx with clinda  Explained importance of dentist follow up  Provided warning signs for deep tissue infection, alden's angina  Salt water gargles, warm compress, NSAIDs and tylenol      Patient Instructions       Follow up with PCP in 3-5 days. Proceed to  ER if symptoms worsen. Chief Complaint     Chief Complaint   Patient presents with   • Dental Pain     Swelling to left side of face "my teeth are bad" onset 4 days ago         History of Present Illness       HPI     Pt presents with onset of swelling over left jaw area for 4 days now, no drainage or bleeding. Reports pain. Denies seeing a dentist ever in her life. No dyspnea, dysphagia, pain with jaw movement, drooling. Review of Systems   Review of Systems   Constitutional: Negative for chills and fever. HENT: Positive for dental problem. Negative for ear pain and sore throat. Eyes: Negative for pain and visual disturbance. Respiratory: Negative for cough, chest tightness and shortness of breath. Cardiovascular: Negative for chest pain and palpitations. Gastrointestinal: Negative for abdominal pain, constipation, diarrhea, nausea and vomiting. Genitourinary: Negative for dysuria, hematuria and menstrual problem. Musculoskeletal: Negative for arthralgias and back pain. Skin: Negative for color change and rash. Neurological: Negative for seizures and syncope. Psychiatric/Behavioral: Negative for dysphoric mood and suicidal ideas. All other systems reviewed and are negative.         Current Medications       Current Outpatient Medications:   •  clindamycin (CLEOCIN) 300 MG capsule, Take 1 capsule (300 mg total) by mouth 4 (four) times a day for 7 days, Disp: 28 capsule, Rfl: 0  •  albuterol (PROVENTIL HFA,VENTOLIN HFA) 90 mcg/act inhaler, Inhale 2 puffs every 4 (four) hours as needed for wheezing, Disp: 18 g, Rfl: 1  •  aspirin (ECOTRIN LOW STRENGTH) 81 mg EC tablet, Take 81 mg by mouth daily, Disp: , Rfl:   •  diltiazem (CARDIZEM CD) 240 mg 24 hr capsule, Take 240 mg by mouth daily, Disp: , Rfl:   •  diltiazem (DILACOR XR) 180 MG 24 hr capsule, Take 180 mg by mouth daily, Disp: , Rfl:   •  escitalopram (Lexapro) 10 mg tablet, Take 1 tablet (10 mg total) by mouth daily (Patient taking differently: Take 10 mg by mouth daily Not taking everyday), Disp: 90 tablet, Rfl: 1  •  Multiple Vitamin (Multi-Vitamin) tablet, Take 1 tablet by mouth daily, Disp: , Rfl:   •  pravastatin (PRAVACHOL) 40 mg tablet, Take 1 tablet (40 mg total) by mouth daily, Disp: 90 tablet, Rfl: 1  No current facility-administered medications for this visit.     Current Allergies     Allergies as of 10/09/2023 - Reviewed 10/09/2023   Allergen Reaction Noted   • Amoxicillin Rash 01/25/2022   • Metoprolol Other (See Comments) 02/06/2023   • Shellfish allergy - food allergy Angioedema and Anaphylaxis 01/09/2015   • Lisinopril Cough 01/10/2023            The following portions of the patient's history were reviewed and updated as appropriate: allergies, current medications, past family history, past medical history, past social history, past surgical history and problem list.     Past Medical History:   Diagnosis Date   • Chronic kidney disease    • Emphysema of lung ([address])    • Hyperlipemia    • Hyperlipemia    • Hypertension    • Kidney disease    • Myeloma ([address])    • Osteopenia    • Rheumatoid arthritis ([address])        Past Surgical History:   Procedure Laterality Date   • EYE SURGERY      myeloma in corner of eye       Family History   Problem Relation Age of Onset   • Hypertension Mother    • Heart disease Mother    • Heart disease Father    • Hypertension Father • Heart attack Brother          Medications have been verified. Objective   Pulse 93   Temp 97.9 °F (36.6 °C)   Resp 18   SpO2 98%        Physical Exam     Physical Exam  Constitutional:       General: She is not in acute distress. Appearance: Normal appearance. HENT:      Head: Normocephalic and atraumatic. Mouth/Throat:      Dentition: Dental tenderness, dental caries and dental abscesses present. Eyes:      Extraocular Movements: Extraocular movements intact. Conjunctiva/sclera: Conjunctivae normal.   Cardiovascular:      Rate and Rhythm: Normal rate. Pulmonary:      Effort: Pulmonary effort is normal. No respiratory distress. Skin:     General: Skin is warm and dry. Neurological:      General: No focal deficit present. Mental Status: She is alert and oriented to person, place, and time.    Psychiatric:         Mood and Affect: Mood normal.         Behavior: Behavior normal.

## 2023-10-14 ENCOUNTER — TELEPHONE (OUTPATIENT)
Dept: URGENT CARE | Facility: CLINIC | Age: 63
End: 2023-10-14

## 2023-10-14 NOTE — TELEPHONE ENCOUNTER
Patient called about blood pressure being elevated since visit in urgent care. Reports that her blood pressure has been elevated since starting the medication. Inquired if she has followed with PCP? Reports that she has not been able to get in to see PCP. Informed patient that the medication prescribed to her does not elevate blood pressure. Instructed patient to go to emergency department as instructions instruct her too. Verbalized an understanding.

## 2023-11-15 ENCOUNTER — OFFICE VISIT (OUTPATIENT)
Dept: URGENT CARE | Facility: CLINIC | Age: 63
End: 2023-11-15
Payer: COMMERCIAL

## 2023-11-15 VITALS
HEART RATE: 93 BPM | SYSTOLIC BLOOD PRESSURE: 131 MMHG | DIASTOLIC BLOOD PRESSURE: 85 MMHG | OXYGEN SATURATION: 96 % | TEMPERATURE: 98.5 F | RESPIRATION RATE: 18 BRPM

## 2023-11-15 DIAGNOSIS — K04.7 DENTAL ABSCESS: Primary | ICD-10-CM

## 2023-11-15 PROCEDURE — 99213 OFFICE O/P EST LOW 20 MIN: CPT | Performed by: STUDENT IN AN ORGANIZED HEALTH CARE EDUCATION/TRAINING PROGRAM

## 2023-11-15 PROCEDURE — S9088 SERVICES PROVIDED IN URGENT: HCPCS | Performed by: STUDENT IN AN ORGANIZED HEALTH CARE EDUCATION/TRAINING PROGRAM

## 2023-11-15 RX ORDER — CLINDAMYCIN HYDROCHLORIDE 300 MG/1
300 CAPSULE ORAL 4 TIMES DAILY
Qty: 28 CAPSULE | Refills: 0 | Status: SHIPPED | OUTPATIENT
Start: 2023-11-15 | End: 2023-11-22

## 2023-11-15 NOTE — PROGRESS NOTES
[facility]        NAME: Bita Bhatia is a 58 y.o. female  : 1960    MRN: [id number]    Assessment and Plan   Dental abscess [K04.7]  1. Dental abscess  clindamycin (CLEOCIN) 300 MG capsule        Discussed c diff risk with repetitive clindamycin, importance of probiotics. Dental abscess noted on exam.  Will send clindamycin. Andrea Ward angina was considered as a differential during evaluation and ruled out. Provided warning signs of deep soft tissue infection. Patient Instructions       Follow up with PCP in 3-5 days. Proceed to  ER if symptoms worsen. Chief Complaint     Chief Complaint   Patient presents with    Dental Pain     Pain bilat lower jaw pain  onset  3 plus weeks ago has dental appoint on 23         History of Present Illness       HPI    P/w onset of left and right posterior lower dental pain with swelling. No bleeding or discharge. Was seen here 4 weeks ago and reports some improvement with antibiotics but no resolution. Seeing dentist next week. Review of Systems   Review of Systems   Constitutional:  Negative for chills and fever. HENT:  Positive for dental problem. Negative for ear pain and sore throat. Eyes:  Negative for pain and visual disturbance. Respiratory:  Negative for cough, chest tightness and shortness of breath. Cardiovascular:  Negative for chest pain and palpitations. Gastrointestinal:  Negative for abdominal pain, constipation, diarrhea, nausea and vomiting. Genitourinary:  Negative for dysuria, hematuria and menstrual problem. Musculoskeletal:  Negative for arthralgias and back pain. Skin:  Negative for color change and rash. Neurological:  Negative for seizures and syncope. Psychiatric/Behavioral:  Negative for dysphoric mood and suicidal ideas. All other systems reviewed and are negative.     Current Medications       Current Outpatient Medications:     clindamycin (CLEOCIN) 300 MG capsule, Take 1 capsule (300 mg total) by mouth 4 (four) times a day for 7 days, Disp: 28 capsule, Rfl: 0    albuterol (PROVENTIL HFA,VENTOLIN HFA) 90 mcg/act inhaler, Inhale 2 puffs every 4 (four) hours as needed for wheezing, Disp: 18 g, Rfl: 1    aspirin (ECOTRIN LOW STRENGTH) 81 mg EC tablet, Take 81 mg by mouth daily, Disp: , Rfl:     diltiazem (CARDIZEM CD) 240 mg 24 hr capsule, Take 240 mg by mouth daily, Disp: , Rfl:     diltiazem (DILACOR XR) 180 MG 24 hr capsule, Take 180 mg by mouth daily, Disp: , Rfl:     escitalopram (Lexapro) 10 mg tablet, Take 1 tablet (10 mg total) by mouth daily (Patient taking differently: Take 10 mg by mouth daily Not taking everyday), Disp: 90 tablet, Rfl: 1    Multiple Vitamin (Multi-Vitamin) tablet, Take 1 tablet by mouth daily, Disp: , Rfl:     pravastatin (PRAVACHOL) 40 mg tablet, Take 1 tablet (40 mg total) by mouth daily, Disp: 90 tablet, Rfl: 1    Current Allergies     Allergies as of 11/15/2023 - Reviewed 11/15/2023   Allergen Reaction Noted    Amoxicillin Rash 01/25/2022    Metoprolol Other (See Comments) 02/06/2023    Shellfish allergy - food allergy Angioedema and Anaphylaxis 01/09/2015    Lisinopril Cough 01/10/2023            The following portions of the patient's history were reviewed and updated as appropriate: allergies, current medications, past family history, past medical history, past social history, past surgical history and problem list.     Past Medical History:   Diagnosis Date    Chronic kidney disease     Emphysema of lung ([address])     Hyperlipemia     Hyperlipemia     Hypertension     Kidney disease     Myeloma ([address])     Osteopenia     Rheumatoid arthritis ([address])        Past Surgical History:   Procedure Laterality Date    EYE SURGERY      myeloma in corner of eye       Family History   Problem Relation Age of Onset    Hypertension Mother     Heart disease Mother     Heart disease Father     Hypertension Father     Heart attack Brother          Medications have been verified.         Objective /85   Pulse 93   Temp 98.5 °F (36.9 °C)   Resp 18   SpO2 96%        Physical Exam     Physical Exam  Constitutional:       General: She is not in acute distress. Appearance: Normal appearance. HENT:      Head: Normocephalic and atraumatic. Mouth/Throat:      Dentition: Abnormal dentition. Dental tenderness, dental caries and dental abscesses present. Comments: Bilateral molars with caries, tender to percussion, erythematous, edematous  Eyes:      Extraocular Movements: Extraocular movements intact. Conjunctiva/sclera: Conjunctivae normal.   Cardiovascular:      Rate and Rhythm: Normal rate. Pulmonary:      Effort: Pulmonary effort is normal. No respiratory distress. Musculoskeletal:      Cervical back: Neck supple. No rigidity or tenderness. Lymphadenopathy:      Cervical: No cervical adenopathy. Skin:     General: Skin is warm and dry. Neurological:      General: No focal deficit present. Mental Status: She is alert and oriented to person, place, and time.    Psychiatric:         Mood and Affect: Mood normal.         Behavior: Behavior normal.

## 2024-01-18 ENCOUNTER — OFFICE VISIT (OUTPATIENT)
Dept: URGENT CARE | Facility: CLINIC | Age: 64
End: 2024-01-18
Payer: COMMERCIAL

## 2024-01-18 VITALS
WEIGHT: 135 LBS | TEMPERATURE: 98.6 F | OXYGEN SATURATION: 95 % | HEIGHT: 65 IN | BODY MASS INDEX: 22.49 KG/M2 | HEART RATE: 110 BPM | DIASTOLIC BLOOD PRESSURE: 95 MMHG | SYSTOLIC BLOOD PRESSURE: 159 MMHG | RESPIRATION RATE: 18 BRPM

## 2024-01-18 DIAGNOSIS — R35.0 FREQUENT URINATION: Primary | ICD-10-CM

## 2024-01-18 LAB
SL AMB  POCT GLUCOSE, UA: ABNORMAL
SL AMB LEUKOCYTE ESTERASE,UA: ABNORMAL
SL AMB POCT BILIRUBIN,UA: ABNORMAL
SL AMB POCT BLOOD,UA: ABNORMAL
SL AMB POCT CLARITY,UA: ABNORMAL
SL AMB POCT COLOR,UA: YELLOW
SL AMB POCT KETONES,UA: ABNORMAL
SL AMB POCT NITRITE,UA: ABNORMAL
SL AMB POCT PH,UA: 5
SL AMB POCT SPECIFIC GRAVITY,UA: 1.01
SL AMB POCT URINE PROTEIN: 2000
SL AMB POCT UROBILINOGEN: 0.2

## 2024-01-18 PROCEDURE — 87186 SC STD MICRODIL/AGAR DIL: CPT | Performed by: PHYSICIAN ASSISTANT

## 2024-01-18 PROCEDURE — 81002 URINALYSIS NONAUTO W/O SCOPE: CPT | Performed by: PHYSICIAN ASSISTANT

## 2024-01-18 PROCEDURE — 87077 CULTURE AEROBIC IDENTIFY: CPT | Performed by: PHYSICIAN ASSISTANT

## 2024-01-18 PROCEDURE — S9088 SERVICES PROVIDED IN URGENT: HCPCS | Performed by: PHYSICIAN ASSISTANT

## 2024-01-18 PROCEDURE — 99213 OFFICE O/P EST LOW 20 MIN: CPT | Performed by: PHYSICIAN ASSISTANT

## 2024-01-18 PROCEDURE — 87086 URINE CULTURE/COLONY COUNT: CPT | Performed by: PHYSICIAN ASSISTANT

## 2024-01-18 RX ORDER — LOSARTAN POTASSIUM 100 MG/1
100 TABLET ORAL EVERY MORNING
COMMUNITY

## 2024-01-18 RX ORDER — ALENDRONATE SODIUM 70 MG/1
TABLET ORAL
COMMUNITY

## 2024-01-18 RX ORDER — FAMOTIDINE 20 MG/1
20 TABLET, FILM COATED ORAL DAILY
COMMUNITY
Start: 2023-10-31

## 2024-01-18 RX ORDER — LEVALBUTEROL TARTRATE 45 UG/1
AEROSOL, METERED ORAL
COMMUNITY

## 2024-01-18 RX ORDER — HYDROCHLOROTHIAZIDE 12.5 MG/1
12.5 CAPSULE, GELATIN COATED ORAL DAILY
COMMUNITY
Start: 2024-01-18

## 2024-01-18 RX ORDER — NITROFURANTOIN 25; 75 MG/1; MG/1
100 CAPSULE ORAL 2 TIMES DAILY
Qty: 10 CAPSULE | Refills: 0 | Status: SHIPPED | OUTPATIENT
Start: 2024-01-18 | End: 2024-01-23

## 2024-01-18 NOTE — PROGRESS NOTES
[facility]        NAME: Corry May is a 63 y.o. female  : 1960    MRN: [id number]  DATE: 2024  TIME: 7:54 PM    Assessment and Plan   Frequent urination [R35.0]  1. Frequent urination  POCT urine dip    Urine culture    nitrofurantoin (MACROBID) 100 mg capsule            Patient Instructions     Patient Instructions   Abdominal Pain   WHAT YOU NEED TO KNOW:   Abdominal pain can be dull, achy, or sharp. You may have pain in one area of your abdomen, or in your entire abdomen. Your pain may be caused by a condition such as constipation, food sensitivity or poisoning, infection, or a blockage. Abdominal pain can also be from a hernia, appendicitis, or an ulcer. Liver, gallbladder, or kidney conditions can also cause abdominal pain. The cause of your abdominal pain may not be known.       DISCHARGE INSTRUCTIONS:   Call your local emergency number (911 in the [place]) if:   You have chest pain or shortness of breath.      Return to the emergency department if:   You have pulsing pain in your upper abdomen or lower back that suddenly becomes constant.    Your pain is in the right lower abdominal area and worsens with movement.    You have a fever over 100.4°F (38°C) or shaking chills.    You are vomiting and cannot keep food or liquids down.    Your pain does not improve or gets worse over the next 8 to 12 hours.    You see blood in your vomit or bowel movements, or they look black and tarry.    Your skin or the whites of your eyes turn yellow.    You are a woman and have a large amount of vaginal bleeding that is not your monthly period.    Call your doctor if:   You have pain in your lower back.    You are a man and have pain in your testicles.    You have pain when you urinate.    You have questions or concerns about your condition or care.    Medicines:  You may need any of the following:  Medicines  may be given to calm your stomach or prevent vomiting.    Prescription pain medicine  may  be given. Ask your healthcare provider how to take this medicine safely. Some prescription pain medicines contain acetaminophen. Do not take other medicines that contain acetaminophen without talking to your healthcare provider. Too much acetaminophen may cause liver damage. Prescription pain medicine may cause constipation. Ask your healthcare provider how to prevent or treat constipation.     Take your medicine as directed.  Contact your healthcare provider if you think your medicine is not helping or if you have side effects. Tell your provider if you are allergic to any medicine. Keep a list of the medicines, vitamins, and herbs you take. Include the amounts, and when and why you take them. Bring the list or the pill bottles to follow-up visits. Carry your medicine list with you in case of an emergency.    Manage or prevent abdominal pain:   Apply heat  on your abdomen for 20 to 30 minutes every 2 hours for as many days as directed. Heat helps decrease pain and muscle spasms.    Make changes to the foods you eat, if needed.  Do not eat foods that cause abdominal pain or other symptoms. Eat small meals more often. The following changes may also help:    Eat more high-fiber foods if you are constipated.  High-fiber foods include fruits, vegetables, whole-grain foods, and legumes such as lira beans.         Do not eat foods that cause gas if you have bloating.  Examples include broccoli, cabbage, beans, and carbonated drinks.    Do not eat foods or drinks that contain sorbitol or fructose if you have diarrhea and bloating.  Some examples are fruit juices, candy, jelly, and sugar-free gum.    Do not eat high-fat foods.  Examples include fried foods, cheeseburgers, hot dogs, and desserts.    Make changes to the liquids you drink, if needed.  Do not drink liquids that cause pain or make it worse, such as orange juice. Drink liquids throughout the day to stay hydrated. The following changes may also help:    Drink  more liquids to prevent dehydration from diarrhea or vomiting.  Ask your healthcare provider how much liquid to drink each day and which liquids are best for you.    Limit or do not have caffeine.  Caffeine may make symptoms such as heartburn or nausea worse.    Limit or do not drink alcohol.  Alcohol can make your abdominal pain worse. Ask your healthcare provider if it is okay for you to drink alcohol. Also ask how much is okay for you to drink. A drink of alcohol is 12 ounces of beer, ½ ounce of liquor, or 5 ounces of wine.    Keep a diary of your abdominal pain.  A diary may help your healthcare provider learn what is causing your pain. Include when the pain happens, how long it lasts, and what the pain feels like. Write down any other symptoms you have with abdominal pain. Also write down what you eat, and any symptoms you have after you eat.    Manage stress.  Stress may cause abdominal pain. Your healthcare provider may recommend relaxation techniques and deep breathing exercises to help decrease your stress. Your healthcare provider may recommend you talk to someone about your stress or anxiety, such as a counselor or a friend. Get plenty of sleep. Exercise regularly.         Do not smoke.  Nicotine and other chemicals in cigarettes can damage your esophagus and stomach. Ask your healthcare provider for information if you currently smoke and need help to quit. E-cigarettes or smokeless tobacco still contain nicotine. Talk to your healthcare provider before you use these products.    Follow up with your doctor as directed:  Write down your questions so you remember to ask them during your visits.  © Copyright [employer] 2023 Information is for End User's use only and may not be sold, redistributed or otherwise used for commercial purposes.  The above information is an  only. It is not intended as medical advice for individual conditions or treatments. Talk to your doctor, nurse or pharmacist  before following any medical regimen to see if it is safe and effective for you.  Urinary Tract Infection in Older Adults   WHAT YOU NEED TO KNOW:   A urinary tract infection (UTI) is caused by bacteria that get inside your urinary tract. Your urinary tract includes your kidneys, ureters, bladder, and urethra. A UTI is more common in your lower urinary tract, which includes your bladder and urethra.       DISCHARGE INSTRUCTIONS:   Return to the emergency department if:   You become confused or agitated.    You fall down.    You are urinating very little or not at all.    You are vomiting.    You have a high fever with shaking chills.     You have side or back pain that gets worse.    Call your doctor if:   You have a fever.    You are a woman and you have increased white or yellow discharge from your vagina.    You do not feel better after 2 days of taking antibiotics.    You have questions or concerns about your condition or care.    Medicines:   Medicines  help treat the bacterial infection or decrease pain and burning when you urinate. You may also need medicines to decrease the urge to urinate often. If you have UTIs often (called recurrent UTIs), you may be given antibiotics to take regularly. You will be given directions for when and how to use antibiotics. The goal is to prevent UTIs but not cause antibiotic resistance by using antibiotics too often.    Take your medicine as directed.  Contact your healthcare provider if you think your medicine is not helping or if you have side effects. Tell your provider if you are allergic to any medicine. Keep a list of the medicines, vitamins, and herbs you take. Include the amounts, and when and why you take them. Bring the list or the pill bottles to follow-up visits. Carry your medicine list with you in case of an emergency.    Self-care:   Drink liquids as directed.  Liquids can help flush bacteria from your urinary tract. Ask how much liquid to drink each day and  which liquids are best for you. You may need to drink more liquids than usual to help flush out the bacteria. Do not drink alcohol, caffeine, and citrus juices. These can irritate your bladder and increase your symptoms.    Apply heat  on your abdomen for 20 to 30 minutes every 2 hours for as many days as directed. Heat helps decrease discomfort and pressure in your bladder.    Prevent a UTI:   Urinate when you feel the urge.  Do not hold your urine. Bacteria can grow if urine stays in the bladder too long. It may be helpful to urinate at least every 3 to 4 hours.    Urinate after you have sex.  This will help flush away bacteria that can enter your urinary tract during sex.    Wear cotton underwear and clothes that are loose.  Tight pants and nylon underwear can trap moisture and cause bacteria to grow.    Drink cranberry juice or take cranberry supplements.  These may help prevent UTIs. Your healthcare provider can recommend the right juice or supplement for you.    Women should wipe front to back after urinating or having a bowel movement.  This may prevent germs from getting into the urinary tract. Do not douche or use feminine deodorants. These can change the chemical balance in your vagina. You may also be given vaginal estrogen medicine. This medicine helps prevent recurrent UTIs in women who have gone through menopause or are in esteban-menopause.    Follow up with your doctor as directed:  Write down your questions so you remember to ask them during your visits.  © Copyright [employer] 2023 Information is for End User's use only and may not be sold, redistributed or otherwise used for commercial purposes.  The above information is an  only. It is not intended as medical advice for individual conditions or treatments. Talk to your doctor, nurse or pharmacist before following any medical regimen to see if it is safe and effective for you.        Follow up with PCP in 3-5 days.  Proceed to  ER if  symptoms worsen.    Chief Complaint     Chief Complaint   Patient presents with    Diarrhea     Bowel Problem her symptoms started a week ago. Bottom hurts from wiping so much     Possible UTI    Abdominal Pain         History of Present Illness       The patient is a 63-year-old female with past medical history significant for dementia who presents the clinic complaining of upset stomach, urinary frequency, and burning for the past 2 days.  She also noticed loose stools.  She states that she did have a recent colonoscopy but cannot remember the exact date of her colonoscopy.  She states that she has been having problems with her bowels ever since having her colonoscopy and does not want to go back and have another colonoscopy.  She states that she is currently switching primary care doctors as she did not get along with her prior PCP.  She is awaiting a neurological consult for her history of dementia.        Review of Systems   Review of Systems   Constitutional:  Negative for chills and fever.   HENT:  Negative for congestion, ear pain, nosebleeds, postnasal drip, sinus pressure, sinus pain and sore throat.    Eyes:  Negative for pain and visual disturbance.   Respiratory:  Negative for cough and shortness of breath.    Cardiovascular:  Negative for chest pain and palpitations.   Gastrointestinal:  Positive for abdominal pain, nausea and vomiting. Negative for anal bleeding, blood in stool, constipation and diarrhea.   Genitourinary:  Negative for dysuria and hematuria.   Musculoskeletal:  Negative for arthralgias and back pain.   Skin:  Negative for color change and rash.   Neurological:  Negative for dizziness, seizures and syncope.   All other systems reviewed and are negative.        Current Medications       Current Outpatient Medications:     albuterol (PROVENTIL HFA,VENTOLIN HFA) 90 mcg/act inhaler, Inhale 2 puffs every 4 (four) hours as needed for wheezing, Disp: 18 g, Rfl: 1    alendronate (FOSAMAX) 70  mg tablet, TAKE 1 TABLET BY MOUTH EVERY 7 DAYS IN THE MORNING ON AN EMPTY STOMACH WITH A FULL GLASS OF WATER, Disp: , Rfl:     diltiazem (CARDIZEM CD) 240 mg 24 hr capsule, Take 240 mg by mouth daily, Disp: , Rfl:     famotidine (PEPCID) 20 mg tablet, Take 20 mg by mouth daily, Disp: , Rfl:     hydrochlorothiazide (MICROZIDE) 12.5 mg capsule, Take 12.5 mg by mouth daily, Disp: , Rfl:     levalbuterol (XOPENEX HFA) 45 mcg/act inhaler, INHALE 2 PUFFS BY MOUTH AND INTO THE LUNGS EVERY 4 HOURS IF NEEDED, Disp: , Rfl:     losartan (COZAAR) 100 MG tablet, Take 100 mg by mouth every morning, Disp: , Rfl:     Multiple Vitamin (Multi-Vitamin) tablet, Take 1 tablet by mouth daily, Disp: , Rfl:     nitrofurantoin (MACROBID) 100 mg capsule, Take 1 capsule (100 mg total) by mouth 2 (two) times a day for 5 days, Disp: 10 capsule, Rfl: 0    pravastatin (PRAVACHOL) 40 mg tablet, Take 1 tablet (40 mg total) by mouth daily, Disp: 90 tablet, Rfl: 1    aspirin (ECOTRIN LOW STRENGTH) 81 mg EC tablet, Take 81 mg by mouth daily (Patient not taking: Reported on 1/18/2024), Disp: , Rfl:     diltiazem (DILACOR XR) 180 MG 24 hr capsule, Take 180 mg by mouth daily (Patient not taking: Reported on 1/18/2024), Disp: , Rfl:     escitalopram (Lexapro) 10 mg tablet, Take 1 tablet (10 mg total) by mouth daily (Patient not taking: Reported on 1/18/2024), Disp: 90 tablet, Rfl: 1    Current Allergies     Allergies as of 01/18/2024 - Reviewed 01/18/2024   Allergen Reaction Noted    Amoxicillin Rash 01/25/2022    Metoprolol Other (See Comments) 02/06/2023    Shellfish allergy - food allergy Angioedema and Anaphylaxis 01/09/2015    Lisinopril Cough 01/10/2023    Hydrochlorothiazide Rash 05/09/2023    Other Rash 07/13/2023            The following portions of the patient's history were reviewed and updated as appropriate: allergies, current medications, past family history, past medical history, past social history, past surgical history and problem list.  "    Past Medical History:   Diagnosis Date    Chronic kidney disease     Emphysema of lung (HCC)     Hyperlipemia     Hyperlipemia     Hypertension     Kidney disease     Myeloma (HCC)     Osteopenia     Rheumatoid arthritis (HCC)        Past Surgical History:   Procedure Laterality Date    EYE SURGERY      myeloma in corner of eye       Family History   Problem Relation Age of Onset    Hypertension Mother     Heart disease Mother     Heart disease Father     Hypertension Father     Heart attack Brother          Medications have been verified.        Objective   /95   Pulse (!) 110   Temp 98.6 °F (37 °C)   Resp 18   Ht 5' 5\" (1.651 m)   Wt 61.2 kg (135 lb)   SpO2 95%   BMI 22.47 kg/m²        Physical Exam     Physical Exam  Constitutional:       Appearance: She is well-developed. She is not diaphoretic.   HENT:      Head: Normocephalic.   Eyes:      General:         Right eye: No discharge.         Left eye: No discharge.      Pupils: Pupils are equal, round, and reactive to light.   Neck:      Thyroid: No thyromegaly.   Cardiovascular:      Rate and Rhythm: Normal rate.      Heart sounds: No murmur heard.  Pulmonary:      Effort: Pulmonary effort is normal. No respiratory distress.      Breath sounds: No wheezing or rales.   Chest:      Chest wall: No tenderness.   Abdominal:      General: There is no distension.      Palpations: Abdomen is soft. There is no shifting dullness or fluid wave.      Tenderness: There is generalized abdominal tenderness. There is no right CVA tenderness, left CVA tenderness, guarding or rebound.      Comments: -There is generalized tenderness noted in the abdomen.  -No guarding or rebound or signs of an acute abdomen.   Musculoskeletal:         General: Normal range of motion.      Cervical back: Normal range of motion.   Lymphadenopathy:      Cervical: No cervical adenopathy.   Skin:     General: Skin is warm.   Neurological:      Mental Status: She is alert and oriented " to person, place, and time.           -Treat for suspected UTI.  She was instructed to go to the ER if symptoms worsen      -Urine culture was sent to the lab.

## 2024-01-19 NOTE — PATIENT INSTRUCTIONS
Abdominal Pain   WHAT YOU NEED TO KNOW:   Abdominal pain can be dull, achy, or sharp. You may have pain in one area of your abdomen, or in your entire abdomen. Your pain may be caused by a condition such as constipation, food sensitivity or poisoning, infection, or a blockage. Abdominal pain can also be from a hernia, appendicitis, or an ulcer. Liver, gallbladder, or kidney conditions can also cause abdominal pain. The cause of your abdominal pain may not be known.       DISCHARGE INSTRUCTIONS:   Call your local emergency number (911 in the [place]) if:   You have chest pain or shortness of breath.      Return to the emergency department if:   You have pulsing pain in your upper abdomen or lower back that suddenly becomes constant.    Your pain is in the right lower abdominal area and worsens with movement.    You have a fever over 100.4°F (38°C) or shaking chills.    You are vomiting and cannot keep food or liquids down.    Your pain does not improve or gets worse over the next 8 to 12 hours.    You see blood in your vomit or bowel movements, or they look black and tarry.    Your skin or the whites of your eyes turn yellow.    You are a woman and have a large amount of vaginal bleeding that is not your monthly period.    Call your doctor if:   You have pain in your lower back.    You are a man and have pain in your testicles.    You have pain when you urinate.    You have questions or concerns about your condition or care.    Medicines:  You may need any of the following:  Medicines  may be given to calm your stomach or prevent vomiting.    Prescription pain medicine  may be given. Ask your healthcare provider how to take this medicine safely. Some prescription pain medicines contain acetaminophen. Do not take other medicines that contain acetaminophen without talking to your healthcare provider. Too much acetaminophen may cause liver damage. Prescription pain medicine may cause constipation. Ask your healthcare  provider how to prevent or treat constipation.     Take your medicine as directed.  Contact your healthcare provider if you think your medicine is not helping or if you have side effects. Tell your provider if you are allergic to any medicine. Keep a list of the medicines, vitamins, and herbs you take. Include the amounts, and when and why you take them. Bring the list or the pill bottles to follow-up visits. Carry your medicine list with you in case of an emergency.    Manage or prevent abdominal pain:   Apply heat  on your abdomen for 20 to 30 minutes every 2 hours for as many days as directed. Heat helps decrease pain and muscle spasms.    Make changes to the foods you eat, if needed.  Do not eat foods that cause abdominal pain or other symptoms. Eat small meals more often. The following changes may also help:    Eat more high-fiber foods if you are constipated.  High-fiber foods include fruits, vegetables, whole-grain foods, and legumes such as lira beans.         Do not eat foods that cause gas if you have bloating.  Examples include broccoli, cabbage, beans, and carbonated drinks.    Do not eat foods or drinks that contain sorbitol or fructose if you have diarrhea and bloating.  Some examples are fruit juices, candy, jelly, and sugar-free gum.    Do not eat high-fat foods.  Examples include fried foods, cheeseburgers, hot dogs, and desserts.    Make changes to the liquids you drink, if needed.  Do not drink liquids that cause pain or make it worse, such as orange juice. Drink liquids throughout the day to stay hydrated. The following changes may also help:    Drink more liquids to prevent dehydration from diarrhea or vomiting.  Ask your healthcare provider how much liquid to drink each day and which liquids are best for you.    Limit or do not have caffeine.  Caffeine may make symptoms such as heartburn or nausea worse.    Limit or do not drink alcohol.  Alcohol can make your abdominal pain worse. Ask your  healthcare provider if it is okay for you to drink alcohol. Also ask how much is okay for you to drink. A drink of alcohol is 12 ounces of beer, ½ ounce of liquor, or 5 ounces of wine.    Keep a diary of your abdominal pain.  A diary may help your healthcare provider learn what is causing your pain. Include when the pain happens, how long it lasts, and what the pain feels like. Write down any other symptoms you have with abdominal pain. Also write down what you eat, and any symptoms you have after you eat.    Manage stress.  Stress may cause abdominal pain. Your healthcare provider may recommend relaxation techniques and deep breathing exercises to help decrease your stress. Your healthcare provider may recommend you talk to someone about your stress or anxiety, such as a counselor or a friend. Get plenty of sleep. Exercise regularly.         Do not smoke.  Nicotine and other chemicals in cigarettes can damage your esophagus and stomach. Ask your healthcare provider for information if you currently smoke and need help to quit. E-cigarettes or smokeless tobacco still contain nicotine. Talk to your healthcare provider before you use these products.    Follow up with your doctor as directed:  Write down your questions so you remember to ask them during your visits.  © Copyright [employer] 2023 Information is for End User's use only and may not be sold, redistributed or otherwise used for commercial purposes.  The above information is an  only. It is not intended as medical advice for individual conditions or treatments. Talk to your doctor, nurse or pharmacist before following any medical regimen to see if it is safe and effective for you.  Urinary Tract Infection in Older Adults   WHAT YOU NEED TO KNOW:   A urinary tract infection (UTI) is caused by bacteria that get inside your urinary tract. Your urinary tract includes your kidneys, ureters, bladder, and urethra. A UTI is more common in your lower  urinary tract, which includes your bladder and urethra.       DISCHARGE INSTRUCTIONS:   Return to the emergency department if:   You become confused or agitated.    You fall down.    You are urinating very little or not at all.    You are vomiting.    You have a high fever with shaking chills.     You have side or back pain that gets worse.    Call your doctor if:   You have a fever.    You are a woman and you have increased white or yellow discharge from your vagina.    You do not feel better after 2 days of taking antibiotics.    You have questions or concerns about your condition or care.    Medicines:   Medicines  help treat the bacterial infection or decrease pain and burning when you urinate. You may also need medicines to decrease the urge to urinate often. If you have UTIs often (called recurrent UTIs), you may be given antibiotics to take regularly. You will be given directions for when and how to use antibiotics. The goal is to prevent UTIs but not cause antibiotic resistance by using antibiotics too often.    Take your medicine as directed.  Contact your healthcare provider if you think your medicine is not helping or if you have side effects. Tell your provider if you are allergic to any medicine. Keep a list of the medicines, vitamins, and herbs you take. Include the amounts, and when and why you take them. Bring the list or the pill bottles to follow-up visits. Carry your medicine list with you in case of an emergency.    Self-care:   Drink liquids as directed.  Liquids can help flush bacteria from your urinary tract. Ask how much liquid to drink each day and which liquids are best for you. You may need to drink more liquids than usual to help flush out the bacteria. Do not drink alcohol, caffeine, and citrus juices. These can irritate your bladder and increase your symptoms.    Apply heat  on your abdomen for 20 to 30 minutes every 2 hours for as many days as directed. Heat helps decrease discomfort  and pressure in your bladder.    Prevent a UTI:   Urinate when you feel the urge.  Do not hold your urine. Bacteria can grow if urine stays in the bladder too long. It may be helpful to urinate at least every 3 to 4 hours.    Urinate after you have sex.  This will help flush away bacteria that can enter your urinary tract during sex.    Wear cotton underwear and clothes that are loose.  Tight pants and nylon underwear can trap moisture and cause bacteria to grow.    Drink cranberry juice or take cranberry supplements.  These may help prevent UTIs. Your healthcare provider can recommend the right juice or supplement for you.    Women should wipe front to back after urinating or having a bowel movement.  This may prevent germs from getting into the urinary tract. Do not douche or use feminine deodorants. These can change the chemical balance in your vagina. You may also be given vaginal estrogen medicine. This medicine helps prevent recurrent UTIs in women who have gone through menopause or are in esteban-menopause.    Follow up with your doctor as directed:  Write down your questions so you remember to ask them during your visits.  © Copyright [employer] 2023 Information is for End User's use only and may not be sold, redistributed or otherwise used for commercial purposes.  The above information is an  only. It is not intended as medical advice for individual conditions or treatments. Talk to your doctor, nurse or pharmacist before following any medical regimen to see if it is safe and effective for you.

## 2024-01-21 LAB — BACTERIA UR CULT: ABNORMAL

## 2024-04-17 ENCOUNTER — HOSPITAL ENCOUNTER (OUTPATIENT)
Dept: MRI IMAGING | Facility: HOSPITAL | Age: 64
Discharge: HOME | End: 2024-04-17
Payer: MEDICARE

## 2024-04-17 DIAGNOSIS — R41.3 OTHER AMNESIA: ICD-10-CM

## 2024-04-17 PROCEDURE — 70551 MRI BRAIN STEM W/O DYE: CPT

## 2024-07-02 ENCOUNTER — OFFICE VISIT (OUTPATIENT)
Dept: URGENT CARE | Facility: CLINIC | Age: 64
End: 2024-07-02
Payer: MEDICARE

## 2024-07-02 VITALS
OXYGEN SATURATION: 96 % | SYSTOLIC BLOOD PRESSURE: 112 MMHG | DIASTOLIC BLOOD PRESSURE: 86 MMHG | TEMPERATURE: 98.5 F | RESPIRATION RATE: 20 BRPM | HEART RATE: 92 BPM

## 2024-07-02 DIAGNOSIS — R30.0 BURNING WITH URINATION: Primary | ICD-10-CM

## 2024-07-02 LAB
SL AMB  POCT GLUCOSE, UA: NEGATIVE
SL AMB LEUKOCYTE ESTERASE,UA: NEGATIVE
SL AMB POCT BILIRUBIN,UA: NEGATIVE
SL AMB POCT BLOOD,UA: ABNORMAL
SL AMB POCT CLARITY,UA: CLEAR
SL AMB POCT COLOR,UA: ABNORMAL
SL AMB POCT KETONES,UA: 15
SL AMB POCT NITRITE,UA: NEGATIVE
SL AMB POCT PH,UA: 6
SL AMB POCT SPECIFIC GRAVITY,UA: 1.01
SL AMB POCT URINE PROTEIN: 300
SL AMB POCT UROBILINOGEN: 0.2

## 2024-07-02 PROCEDURE — 87086 URINE CULTURE/COLONY COUNT: CPT

## 2024-07-02 PROCEDURE — 81002 URINALYSIS NONAUTO W/O SCOPE: CPT

## 2024-07-02 PROCEDURE — G0463 HOSPITAL OUTPT CLINIC VISIT: HCPCS

## 2024-07-02 PROCEDURE — 99213 OFFICE O/P EST LOW 20 MIN: CPT

## 2024-07-02 RX ORDER — DILTIAZEM HYDROCHLORIDE 360 MG/1
360 CAPSULE, EXTENDED RELEASE ORAL DAILY
COMMUNITY
Start: 2024-06-11

## 2024-07-02 RX ORDER — PAROXETINE HYDROCHLORIDE 20 MG/1
TABLET, FILM COATED ORAL
COMMUNITY
Start: 2024-06-27

## 2024-07-02 RX ORDER — OLANZAPINE 5 MG/1
5 TABLET ORAL
COMMUNITY
Start: 2024-07-01 | End: 2024-07-31

## 2024-07-02 RX ORDER — LORATADINE 10 MG/1
10 TABLET ORAL DAILY
COMMUNITY

## 2024-07-02 RX ORDER — NITROFURANTOIN 25; 75 MG/1; MG/1
100 CAPSULE ORAL 2 TIMES DAILY
Qty: 10 CAPSULE | Refills: 0 | Status: SHIPPED | OUTPATIENT
Start: 2024-07-02 | End: 2024-07-02

## 2024-07-02 RX ORDER — KETOCONAZOLE 20 MG/ML
SHAMPOO TOPICAL
COMMUNITY
Start: 2024-02-23

## 2024-07-02 RX ORDER — POTASSIUM CHLORIDE 750 MG/1
10 TABLET, EXTENDED RELEASE ORAL DAILY
COMMUNITY
Start: 2024-04-03 | End: 2025-04-03

## 2024-07-02 RX ORDER — NITROFURANTOIN 25; 75 MG/1; MG/1
100 CAPSULE ORAL 2 TIMES DAILY
Qty: 10 CAPSULE | Refills: 0 | Status: SHIPPED | OUTPATIENT
Start: 2024-07-02 | End: 2024-07-07

## 2024-07-02 RX ORDER — DILTIAZEM HYDROCHLORIDE 360 MG/1
360 CAPSULE, EXTENDED RELEASE ORAL EVERY MORNING
COMMUNITY
Start: 2024-03-25

## 2024-07-02 NOTE — PATIENT INSTRUCTIONS
"Take prescribe medication as instructed.  Tylenol for pain or fever.  Make sure to drink plenty fluids and rest.  Over-the-counter cranberry tablets.  May try over-the-counter Azo for discomfort during urination.  Follow-up with family doctor or OB/GYN if no improvement.  Follow up with PCP in 3-5 days.  Proceed to  ER if symptoms worsen.    If tests are performed, our office will contact you with results only if changes need to made to the care plan discussed with you at the visit. You can review your full results on [facility].  Patient Education     Urinary tract infection - Discharge instructions   The Basics   Written by the doctors and editors at [facility]   What are discharge instructions? -- Discharge instructions are information about how to take care of yourself after getting medical care for a health problem.  What is a urinary tract infection? -- A urinary tract infection (\"UTI\") is an infection that affects either the bladder or the kidneys (figure 1). A kidney infection is more serious, and can lead to other serious problems if it is not treated properly.  You need to take antibiotics to treat a UTI. It is important to take all of your antibiotics, even if you start to feel better.  How do I care for myself at home? -- Ask the doctor or nurse what you should do when you go home. Make sure that you understand exactly what you need to do to care for yourself. Ask questions if there is anything you do not understand.  You should also:   Take all of your medicines as instructed.   Take phenazopyridine (sample brand name: AZO Urinary Pain Relief) for the first day or so, if you choose. This is an over-the-counter medicine. It will help numb your bladder and decrease the urge to urinate. This medicine causes your urine and tears to look orange.   Take acetaminophen (sample brand name: Tylenol) if needed for pain.   Drink extra fluids. This can help prevent more bladder infections. If you have sex, " these things might also help:   Urinate right afterward.   If you use birth control, use a form that does not contain spermicide.  When should I call the doctor? -- Call for advice if:   You have pain in your back, shoulder, or belly.   You have a fever, shaking chills, or sweats even though you are taking antibiotics.   You notice more blood in your urine.   Your symptoms get worse or do not get better within 24 hours of starting antibiotics.   Your symptoms come back after finishing treatment.   You have any new or worrying symptoms.  All topics are updated as new evidence becomes available and our peer review process is complete.  This topic retrieved from [employer] on: Feb 26, 2024.  Topic 872961 Version 1.0  Release: 32.2.4 - C32.56  © 2024 UpToDate, Inc. and/or its affiliates. All rights reserved.  figure 1: Anatomy of the urinary tract     Urine is made by the kidneys. It passes from the kidneys into the bladder through 2 tubes called the ureters. Then, it leaves the bladder through another tube called the urethra.  Graphic 16501 Version 8.0  Consumer Information Use and Disclaimer   Disclaimer: This generalized information is a limited summary of diagnosis, treatment, and/or medication information. It is not meant to be comprehensive and should be used as a tool to help the user understand and/or assess potential diagnostic and treatment options. It does NOT include all information about conditions, treatments, medications, side effects, or risks that may apply to a specific patient. It is not intended to be medical advice or a substitute for the medical advice, diagnosis, or treatment of a health care provider based on the health care provider's examination and assessment of a patient's specific and unique circumstances. Patients must speak with a health care provider for complete information about their health, medical questions, and treatment options, including any risks or benefits regarding use of  medications. This information does not endorse any treatments or medications as safe, effective, or approved for treating a specific patient. UpToDate, Inc. and its affiliates disclaim any warranty or liability relating to this information or the use thereof.The use of this information is governed by the Terms of Use, available at https://www.[employer].com/en/know/clinical-effectiveness-terms. 2024© UpToDate, Inc. and its affiliates and/or licensors. All rights reserved.  Copyright   © 2024 UpToDate, Inc. and/or its affiliates. All rights reserved.

## 2024-07-02 NOTE — PROGRESS NOTES
[facility]        NAME: Corry May is a 64 y.o. female  : 1960    MRN: [id number]  DATE: 2024  TIME: 2:32 PM    Assessment and Plan   Burning with urination [R30.0]  1. Burning with urination  POCT urine dip    Urine culture    nitrofurantoin (MACROBID) 100 mg capsule    DISCONTINUED: nitrofurantoin (MACROBID) 100 mg capsule        Burning with urination that began 3 to 4 days ago urine dip positive for blood and protein as well as ketones.  Negative nitrite and leukocyte.  Patient states she has history of CKD.  Will start on Macrobid.  Sending for urine culture.  Advise follow-up with family doctor.  Advised with ER if any symptoms worsen.    Patient Instructions     Take prescribe medication as instructed.  Tylenol for pain or fever.  Make sure to drink plenty fluids and rest.  Over-the-counter cranberry tablets.  May try over-the-counter Azo for discomfort during urination.  Follow-up with family doctor or OB/GYN if no improvement.  Follow up with PCP in 3-5 days.  Proceed to  ER if symptoms worsen.    If tests are performed, our office will contact you with results only if changes need to made to the care plan discussed with you at the visit. You can review your full results on [facility].    Chief Complaint     Chief Complaint   Patient presents with    Possible UTI     Symptoms started 3 to 4 days ago, burns when she urinates         History of Present Illness       64-year-old female presents the clinic with 3 to 4 days of burning with urination.  History of UTI in the past, denies frequent UTI.  Took Tylenol over-the-counter.  She denies fever, chills, chest pain, shortness of breath,abdominal pain, vaginal bleeding, flank pain.        Review of Systems   Review of Systems   Constitutional: Negative.    Respiratory: Negative.     Cardiovascular: Negative.    Genitourinary:  Negative for flank pain, hematuria, pelvic pain and vaginal bleeding.        Burning with  urination   Musculoskeletal: Negative.    Neurological: Negative.          Current Medications       Current Outpatient Medications:     albuterol (PROVENTIL HFA,VENTOLIN HFA) 90 mcg/act inhaler, Inhale 2 puffs every 4 (four) hours as needed for wheezing, Disp: 18 g, Rfl: 1    alendronate (FOSAMAX) 70 mg tablet, TAKE 1 TABLET BY MOUTH EVERY 7 DAYS IN THE MORNING ON AN EMPTY STOMACH WITH A FULL GLASS OF WATER, Disp: , Rfl:     diltiazem (CARDIZEM CD) 360 MG 24 hr capsule, Take 360 mg by mouth daily, Disp: , Rfl:     escitalopram (Lexapro) 10 mg tablet, Take 1 tablet (10 mg total) by mouth daily, Disp: 90 tablet, Rfl: 1    famotidine (PEPCID) 20 mg tablet, Take 20 mg by mouth daily, Disp: , Rfl:     ketoconazole (NIZORAL) 2 % shampoo, Apply topically, Disp: , Rfl:     levalbuterol (XOPENEX HFA) 45 mcg/act inhaler, INHALE 2 PUFFS BY MOUTH AND INTO THE LUNGS EVERY 4 HOURS IF NEEDED, Disp: , Rfl:     loratadine (CLARITIN) 10 mg tablet, Take 10 mg by mouth daily, Disp: , Rfl:     losartan (COZAAR) 100 MG tablet, Take 100 mg by mouth every morning, Disp: , Rfl:     Multiple Vitamin (Multi-Vitamin) tablet, Take 1 tablet by mouth daily, Disp: , Rfl:     nitrofurantoin (MACROBID) 100 mg capsule, Take 1 capsule (100 mg total) by mouth 2 (two) times a day for 5 days, Disp: 10 capsule, Rfl: 0    OLANZapine (ZyPREXA) 5 mg tablet, Take 5 mg by mouth, Disp: , Rfl:     PARoxetine (PAXIL) 20 mg tablet, , Disp: , Rfl:     potassium chloride (Klor-Con M10) 10 mEq tablet, Take 10 mEq by mouth daily, Disp: , Rfl:     pravastatin (PRAVACHOL) 40 mg tablet, Take 1 tablet (40 mg total) by mouth daily, Disp: 90 tablet, Rfl: 1    Tiadylt  MG 24 hr capsule, Take 360 mg by mouth every morning, Disp: , Rfl:     aspirin (ECOTRIN LOW STRENGTH) 81 mg EC tablet, Take 81 mg by mouth daily (Patient not taking: Reported on 1/18/2024), Disp: , Rfl:     diltiazem (CARDIZEM CD) 240 mg 24 hr capsule, Take 240 mg by mouth daily (Patient not taking:  Reported on 7/2/2024), Disp: , Rfl:     diltiazem (DILACOR XR) 180 MG 24 hr capsule, Take 180 mg by mouth daily (Patient not taking: Reported on 1/18/2024), Disp: , Rfl:     hydrochlorothiazide (MICROZIDE) 12.5 mg capsule, Take 12.5 mg by mouth daily (Patient not taking: Reported on 7/2/2024), Disp: , Rfl:     Current Allergies     Allergies as of 07/02/2024 - Reviewed 07/02/2024   Allergen Reaction Noted    Amoxicillin Rash 01/25/2022    Metoprolol Other (See Comments) 02/06/2023    Shellfish allergy - food allergy Angioedema and Anaphylaxis 01/09/2015    Lisinopril Cough 01/10/2023    Other Rash 07/13/2023            The following portions of the patient's history were reviewed and updated as appropriate: allergies, current medications, past family history, past medical history, past social history, past surgical history and problem list.     Past Medical History:   Diagnosis Date    Chronic kidney disease     Emphysema of lung (HCC)     Hyperlipemia     Hyperlipemia     Hypertension     Kidney disease     Myeloma (HCC)     Osteopenia     Rheumatoid arthritis (HCC)        Past Surgical History:   Procedure Laterality Date    EYE SURGERY      myeloma in corner of eye       Family History   Problem Relation Age of Onset    Hypertension Mother     Heart disease Mother     Heart disease Father     Hypertension Father     Heart attack Brother          Medications have been verified.        Objective   /86   Pulse 92   Temp 98.5 °F (36.9 °C)   Resp 20   SpO2 96%        Physical Exam     Physical Exam  Constitutional:       General: She is not in acute distress.     Appearance: Normal appearance. She is not ill-appearing, toxic-appearing or diaphoretic.   HENT:      Head: Normocephalic and atraumatic.   Cardiovascular:      Rate and Rhythm: Normal rate and regular rhythm.      Pulses: Normal pulses.      Heart sounds: Normal heart sounds.   Pulmonary:      Effort: Pulmonary effort is normal. No respiratory  distress.      Breath sounds: Normal breath sounds. No stridor. No wheezing, rhonchi or rales.   Abdominal:      General: Abdomen is flat. There is no distension.      Palpations: Abdomen is soft.      Tenderness: There is no abdominal tenderness. There is no right CVA tenderness, left CVA tenderness, guarding or rebound.   Skin:     General: Skin is warm and dry.      Capillary Refill: Capillary refill takes less than 2 seconds.   Neurological:      General: No focal deficit present.      Mental Status: She is alert.   Psychiatric:         Mood and Affect: Mood normal.

## 2024-07-03 LAB — BACTERIA UR CULT: NORMAL

## 2024-07-18 ENCOUNTER — OFFICE VISIT (OUTPATIENT)
Dept: URGENT CARE | Facility: CLINIC | Age: 64
End: 2024-07-18
Payer: MEDICARE

## 2024-07-18 VITALS
RESPIRATION RATE: 18 BRPM | BODY MASS INDEX: 23.32 KG/M2 | SYSTOLIC BLOOD PRESSURE: 110 MMHG | WEIGHT: 140 LBS | HEART RATE: 107 BPM | TEMPERATURE: 98.9 F | HEIGHT: 65 IN | DIASTOLIC BLOOD PRESSURE: 70 MMHG | OXYGEN SATURATION: 96 %

## 2024-07-18 DIAGNOSIS — M79.662 PAIN OF LEFT CALF: Primary | ICD-10-CM

## 2024-07-18 PROCEDURE — 99213 OFFICE O/P EST LOW 20 MIN: CPT

## 2024-07-18 PROCEDURE — G0463 HOSPITAL OUTPT CLINIC VISIT: HCPCS

## 2024-07-18 NOTE — PROGRESS NOTES
[facility]        NAME: Corry May is a 64 y.o. female  : 1960    MRN: [id number]  DATE: 2024  TIME: 1:00 PM    Assessment and Plan   Pain of left calf [M79.662]  1. Pain of left calf          Left calf pain that began about a week ago.  Is not worsening.  No recent injury or change in activity.  No history of clotting disorder/prior DVTs.  On exam-there is no tenderness to palpation.  There is no erythema, swelling, warmth, wound.  Right calf measures 14-1/2 inches and left calf measures 14.25 inches.  Low suspicion for DVT at this time.  I did recommend to go to the emergency room if any symptoms suddenly change or worsen.  I advised following up with family doctor within the next 1 to 2 days.    Patient Instructions     Tylenol/ibuprofen for pain.  Elevate leg when resting.  If you notice any increased swelling/redness/warmth/pain-recommend to go to the emergency room immediately for further evaluation.  If no improvement, recommend following up with your family doctor.  Follow up with PCP in 3-5 days.  Proceed to  ER if symptoms worsen.    If tests are performed, our office will contact you with results only if changes need to made to the care plan discussed with you at the visit. You can review your full results on [facility].    Chief Complaint     Chief Complaint   Patient presents with    Leg Pain     Left calf pain x 1 week          History of Present Illness       64-year-old female presents the clinic with pain in her left mid calf.  Going on about a week.  Has not worsened or improved.  Denies any recent injury, fall, trauma, change in activity.  Denies history of clotting disorder or DVT.  Patient denies any swelling, redness, wound, insect bite, chest pain, shortness of breath, fever, chills.    Leg Pain         Review of Systems   Review of Systems   Constitutional: Negative.    HENT: Negative.     Respiratory: Negative.     Cardiovascular: Negative.     Musculoskeletal:  Positive for myalgias.   Skin: Negative.    Neurological: Negative.    Psychiatric/Behavioral: Negative.           Current Medications       Current Outpatient Medications:     albuterol (PROVENTIL HFA,VENTOLIN HFA) 90 mcg/act inhaler, Inhale 2 puffs every 4 (four) hours as needed for wheezing, Disp: 18 g, Rfl: 1    alendronate (FOSAMAX) 70 mg tablet, TAKE 1 TABLET BY MOUTH EVERY 7 DAYS IN THE MORNING ON AN EMPTY STOMACH WITH A FULL GLASS OF WATER, Disp: , Rfl:     escitalopram (Lexapro) 10 mg tablet, Take 1 tablet (10 mg total) by mouth daily, Disp: 90 tablet, Rfl: 1    famotidine (PEPCID) 20 mg tablet, Take 20 mg by mouth daily, Disp: , Rfl:     ketoconazole (NIZORAL) 2 % shampoo, Apply topically, Disp: , Rfl:     levalbuterol (XOPENEX HFA) 45 mcg/act inhaler, INHALE 2 PUFFS BY MOUTH AND INTO THE LUNGS EVERY 4 HOURS IF NEEDED, Disp: , Rfl:     losartan (COZAAR) 100 MG tablet, Take 100 mg by mouth every morning, Disp: , Rfl:     Multiple Vitamin (Multi-Vitamin) tablet, Take 1 tablet by mouth daily, Disp: , Rfl:     OLANZapine (ZyPREXA) 5 mg tablet, Take 5 mg by mouth, Disp: , Rfl:     PARoxetine (PAXIL) 20 mg tablet, , Disp: , Rfl:     potassium chloride (Klor-Con M10) 10 mEq tablet, Take 10 mEq by mouth daily, Disp: , Rfl:     pravastatin (PRAVACHOL) 40 mg tablet, Take 1 tablet (40 mg total) by mouth daily, Disp: 90 tablet, Rfl: 1    Tiadylt  MG 24 hr capsule, Take 360 mg by mouth every morning, Disp: , Rfl:     aspirin (ECOTRIN LOW STRENGTH) 81 mg EC tablet, Take 81 mg by mouth daily (Patient not taking: Reported on 7/18/2024), Disp: , Rfl:     diltiazem (CARDIZEM CD) 240 mg 24 hr capsule, Take 240 mg by mouth daily (Patient not taking: Reported on 7/2/2024), Disp: , Rfl:     diltiazem (CARDIZEM CD) 360 MG 24 hr capsule, Take 360 mg by mouth daily (Patient not taking: Reported on 7/18/2024), Disp: , Rfl:     diltiazem (DILACOR XR) 180 MG 24 hr capsule, Take 180 mg by mouth daily (Patient  "not taking: Reported on 1/18/2024), Disp: , Rfl:     hydrochlorothiazide (MICROZIDE) 12.5 mg capsule, Take 12.5 mg by mouth daily (Patient not taking: Reported on 7/2/2024), Disp: , Rfl:     loratadine (CLARITIN) 10 mg tablet, Take 10 mg by mouth daily, Disp: , Rfl:     Current Allergies     Allergies as of 07/18/2024 - Reviewed 07/18/2024   Allergen Reaction Noted    Amoxicillin Rash 01/25/2022    Metoprolol Other (See Comments) 02/06/2023    Shellfish allergy - food allergy Angioedema and Anaphylaxis 01/09/2015    Lisinopril Cough 01/10/2023    Other Rash 07/13/2023            The following portions of the patient's history were reviewed and updated as appropriate: allergies, current medications, past family history, past medical history, past social history, past surgical history and problem list.     Past Medical History:   Diagnosis Date    Chronic kidney disease     Emphysema of lung (HCC)     Hyperlipemia     Hyperlipemia     Hypertension     Kidney disease     Myeloma (HCC)     Osteopenia     Rheumatoid arthritis (HCC)        Past Surgical History:   Procedure Laterality Date    EYE SURGERY      myeloma in corner of eye       Family History   Problem Relation Age of Onset    Hypertension Mother     Heart disease Mother     Heart disease Father     Hypertension Father     Heart attack Brother          Medications have been verified.        Objective   /70   Pulse (!) 107   Temp 98.9 °F (37.2 °C)   Resp 18   Ht 5' 5\" (1.651 m)   Wt 63.5 kg (140 lb)   SpO2 96%   BMI 23.30 kg/m²        Physical Exam     Physical Exam  Constitutional:       General: She is not in acute distress.     Appearance: Normal appearance. She is not ill-appearing, toxic-appearing or diaphoretic.   HENT:      Head: Normocephalic and atraumatic.      Mouth/Throat:      Mouth: Mucous membranes are moist.      Pharynx: Oropharynx is clear.   Cardiovascular:      Rate and Rhythm: Normal rate and regular rhythm.      Pulses: Normal " pulses.      Heart sounds: Normal heart sounds.   Pulmonary:      Effort: Pulmonary effort is normal. No respiratory distress.      Breath sounds: Normal breath sounds. No stridor. No wheezing, rhonchi or rales.   Musculoskeletal:      Right lower leg: Normal.      Left lower leg: No swelling, deformity, lacerations, tenderness or bony tenderness. No edema.        Legs:       Comments: There is no obvious tenderness with light and firm palpation of the left calf muscle.  There is no obvious swelling, erythema, wound, drainage, edema.  Right calf measures 14.5 inches and left calf measures 14.25 inches.  Negative Homans' sign.  Equal sensation to light touch bilaterally with equal and symmetrical reflexes.  Normal range of motion bilaterallyin the ankle, knees, hips.  2+ dorsalis pedis and posterior tibialis pulse.  2+ popliteal pulse.  There is no obvious varicose vein.   Skin:     General: Skin is warm and dry.      Capillary Refill: Capillary refill takes less than 2 seconds.      Findings: No bruising, erythema or rash.   Neurological:      General: No focal deficit present.      Mental Status: She is alert and oriented to person, place, and time.   Psychiatric:         Mood and Affect: Mood normal.         Behavior: Behavior normal.

## 2024-07-18 NOTE — PATIENT INSTRUCTIONS
"Tylenol/ibuprofen for pain.  Elevate leg when resting.  If you notice any increased swelling/redness/warmth/pain-recommend to go to the emergency room immediately for further evaluation.  If no improvement, recommend following up with your family doctor.  Follow up with PCP in 3-5 days.  Proceed to  ER if symptoms worsen.    If tests are performed, our office will contact you with results only if changes need to made to the care plan discussed with you at the visit. You can review your full results on [facility] My[facility].  Patient Education     Muscle and bone pain - Discharge instructions   The Basics   Written by the doctors and editors at [facility]   What are discharge instructions? -- Discharge instructions are information about how to take care of yourself after getting medical care for a health problem.  What are muscle and bone pain? -- Muscle and bone pain are common symptoms. But it can be hard for doctors to tell exactly where the pain is coming from. You might hear this kind of pain called \"musculoskeletal\" pain.   Muscle pain is more common than bone pain. It often happens when you are using a muscle and gets better when you rest. The pain might come and go with activity. Muscle pain can be in just 1 area, over a larger part of your body, or in your whole body.   Bone pain can often be felt even when you are not using that part your body. It is often felt deeper within the body and can last a longer time. With bone pain, it might be easier to point to a specific area that hurts.  Lots of different things can cause these types of pain. For example, an injury can cause muscle or bone pain that happens suddenly. Chronic illnesses like arthritis can cause pain that gets worse over time. Many different health problems can cause muscle or bone pain, too.  How do I care for myself at home? -- Ask the doctor or nurse what you should do when you go home. Make sure that you understand exactly what you need to do to " care for yourself. Ask questions if there is anything you do not understand.  Your care and treatment will depend on the likely cause of your pain. For example, your doctor or nurse might suggest that you:   Avoid or stop activities that causes you pain. Some kinds of muscle pain are caused by using a muscle in the same way over and over. You might need to stop or limit this activity to let your body heal.   Take non-prescription medicines to relieve pain. Examples include acetaminophen (sample brand name: Tylenol), ibuprofen (sample brand names: Advil, Motrin), or naproxen (sample brand name: Aleve).   Use a splint, brace, or elastic bandage to let the injured area rest and heal.   Use ice or heat to help with pain:   Ice - Put a cold gel pack, bag of ice, or bag of frozen vegetables on the painful area every 1 to 2 hours, for 15 minutes each time. Put a thin towel between the ice (or another cold object) and your skin.   Heat - If it helps, use heat on the painful area for short periods of time. Put a heating pad (on the low setting) on the area for 20 minutes at a time a few times each day. Never go to sleep with a heating pad on as this can cause burns.   Do exercises to stretch and strengthen your muscles. They might also suggest improving your posture and form. This can limit the stress and strain on your bones and muscles.  What follow-up care do I need? -- Your doctor or nurse will tell you if you need to make a follow-up appointment. If so, make sure that you know when and where to go.  When should I call the doctor? -- Call for advice if:   You have a fever of 100.4°F (38°C) or higher, or chills.   Your pain is not relieved by non-prescription medicines.   You have trouble doing your daily tasks.   You are still having pain in 2 weeks.  All topics are updated as new evidence becomes available and our peer review process is complete.  This topic retrieved from [employer] on: Mar 13, 2024.  Topic 795672  Version 1.0  Release: 32.2.4 - C32.71  © 2024 UpToDate, Inc. and/or its affiliates. All rights reserved.  Consumer Information Use and Disclaimer   Disclaimer: This generalized information is a limited summary of diagnosis, treatment, and/or medication information. It is not meant to be comprehensive and should be used as a tool to help the user understand and/or assess potential diagnostic and treatment options. It does NOT include all information about conditions, treatments, medications, side effects, or risks that may apply to a specific patient. It is not intended to be medical advice or a substitute for the medical advice, diagnosis, or treatment of a health care provider based on the health care provider's examination and assessment of a patient's specific and unique circumstances. Patients must speak with a health care provider for complete information about their health, medical questions, and treatment options, including any risks or benefits regarding use of medications. This information does not endorse any treatments or medications as safe, effective, or approved for treating a specific patient. UpToDate, Inc. and its affiliates disclaim any warranty or liability relating to this information or the use thereof.The use of this information is governed by the Terms of Use, available at https://www.[employer].com/en/know/clinical-effectiveness-terms. 2024© UpToDate, Inc. and its affiliates and/or licensors. All rights reserved.  Copyright   © 2024 UpToDate, Inc. and/or its affiliates. All rights reserved.

## 2024-09-06 ENCOUNTER — OFFICE VISIT (OUTPATIENT)
Dept: URGENT CARE | Facility: CLINIC | Age: 64
End: 2024-09-06
Payer: MEDICARE

## 2024-09-06 VITALS
DIASTOLIC BLOOD PRESSURE: 81 MMHG | SYSTOLIC BLOOD PRESSURE: 130 MMHG | RESPIRATION RATE: 20 BRPM | HEART RATE: 86 BPM | OXYGEN SATURATION: 96 % | TEMPERATURE: 98.3 F

## 2024-09-06 DIAGNOSIS — N30.01 ACUTE CYSTITIS WITH HEMATURIA: Primary | ICD-10-CM

## 2024-09-06 DIAGNOSIS — R10.9 FLANK PAIN: ICD-10-CM

## 2024-09-06 LAB
SL AMB  POCT GLUCOSE, UA: ABNORMAL
SL AMB LEUKOCYTE ESTERASE,UA: ABNORMAL
SL AMB POCT BILIRUBIN,UA: ABNORMAL
SL AMB POCT BLOOD,UA: ABNORMAL
SL AMB POCT CLARITY,UA: ABNORMAL
SL AMB POCT COLOR,UA: ABNORMAL
SL AMB POCT KETONES,UA: ABNORMAL
SL AMB POCT NITRITE,UA: ABNORMAL
SL AMB POCT PH,UA: 7
SL AMB POCT SPECIFIC GRAVITY,UA: 1.01
SL AMB POCT URINE PROTEIN: ABNORMAL
SL AMB POCT UROBILINOGEN: 0.2

## 2024-09-06 PROCEDURE — 87086 URINE CULTURE/COLONY COUNT: CPT

## 2024-09-06 PROCEDURE — 87147 CULTURE TYPE IMMUNOLOGIC: CPT

## 2024-09-06 PROCEDURE — 87186 SC STD MICRODIL/AGAR DIL: CPT

## 2024-09-06 PROCEDURE — 99213 OFFICE O/P EST LOW 20 MIN: CPT

## 2024-09-06 PROCEDURE — 87077 CULTURE AEROBIC IDENTIFY: CPT

## 2024-09-06 PROCEDURE — 81002 URINALYSIS NONAUTO W/O SCOPE: CPT

## 2024-09-06 PROCEDURE — G0463 HOSPITAL OUTPT CLINIC VISIT: HCPCS

## 2024-09-06 RX ORDER — NITROFURANTOIN 25; 75 MG/1; MG/1
100 CAPSULE ORAL 2 TIMES DAILY
Qty: 14 CAPSULE | Refills: 0 | Status: CANCELLED | OUTPATIENT
Start: 2024-09-06 | End: 2024-09-13

## 2024-09-06 RX ORDER — LEVOFLOXACIN 750 MG/1
750 TABLET, FILM COATED ORAL EVERY 24 HOURS
Qty: 7 TABLET | Refills: 0 | Status: SHIPPED | OUTPATIENT
Start: 2024-09-06 | End: 2024-09-13

## 2024-09-06 NOTE — PATIENT INSTRUCTIONS
You had testing performed here today which will be sent out for results. You can see your results in your MyChart if you have one, and you will also be contacted by the office if any treatments are needed or anything need to change with your care.   Urine culture pending and we will call you if we have to make any changes to your antibiotics.     Drink plenty of water.     Follow up with your PCP for a recheck at the completion of your ABX, sooner if symptoms persist or worsen.

## 2024-09-06 NOTE — PROGRESS NOTES
[facility]        NAME: Corry May is a 64 y.o. female  : 1960    MRN: [id number]  DATE: 2024  TIME: 3:43 PM    Assessment and Plan   Acute cystitis with hematuria [N30.01]  1. Acute cystitis with hematuria  POCT urine dip        Urine dip in office positive for trace ketones, small blood, trace protein, and large leukocytes. Will place patient on ABX and send urine culture. Will call patient if change in treatment plan is needed.  Had significant discussion with patient regarding the presence of right flank pain and right CVA tenderness on this visit.  Patient declines need to go to the emergency room at this time.  Explained to patient if her symptoms worsen, she develops any fevers or chills, or the pain worsens she will need to go directly to the emergency room for further evaluation and treatment.  She verbalized understanding.    Patient Instructions     You had testing performed here today which will be sent out for results. You can see your results in your MyChart if you have one, and you will also be contacted by the office if any treatments are needed or anything need to change with your care.   Urine culture pending and we will call you if we have to make any changes to your antibiotics.     Drink plenty of water.     Follow up with your PCP for a recheck at the completion of your ABX, sooner if symptoms persist or worsen.  Follow up with PCP in 3-5 days.  Proceed to  ER if symptoms worsen.    If tests are performed, our office will contact you with results only if changes need to made to the care plan discussed with you at the visit. You can review your full results on [facility] Mychart.    Chief Complaint     Chief Complaint   Patient presents with    Possible UTI     Burning, started a week and a 1/2 ago         History of Present Illness       64-year-old female presents to this clinic with complaint of possible UTI.  She reports dysuria.  Symptoms started about  1-1/2 weeks ago.      Review of Systems   Review of Systems   Constitutional:  Negative for chills and fever.   Genitourinary:  Positive for dysuria and flank pain. Negative for hematuria, vaginal bleeding, vaginal discharge and vaginal pain.        Bladder fullness         Current Medications       Current Outpatient Medications:     albuterol (PROVENTIL HFA,VENTOLIN HFA) 90 mcg/act inhaler, Inhale 2 puffs every 4 (four) hours as needed for wheezing, Disp: 18 g, Rfl: 1    alendronate (FOSAMAX) 70 mg tablet, TAKE 1 TABLET BY MOUTH EVERY 7 DAYS IN THE MORNING ON AN EMPTY STOMACH WITH A FULL GLASS OF WATER, Disp: , Rfl:     escitalopram (Lexapro) 10 mg tablet, Take 1 tablet (10 mg total) by mouth daily, Disp: 90 tablet, Rfl: 1    famotidine (PEPCID) 20 mg tablet, Take 20 mg by mouth daily, Disp: , Rfl:     levalbuterol (XOPENEX HFA) 45 mcg/act inhaler, INHALE 2 PUFFS BY MOUTH AND INTO THE LUNGS EVERY 4 HOURS IF NEEDED, Disp: , Rfl:     loratadine (CLARITIN) 10 mg tablet, Take 10 mg by mouth daily, Disp: , Rfl:     losartan (COZAAR) 100 MG tablet, Take 100 mg by mouth every morning, Disp: , Rfl:     Multiple Vitamin (Multi-Vitamin) tablet, Take 1 tablet by mouth daily, Disp: , Rfl:     PARoxetine (PAXIL) 20 mg tablet, , Disp: , Rfl:     potassium chloride (Klor-Con M10) 10 mEq tablet, Take 10 mEq by mouth daily, Disp: , Rfl:     pravastatin (PRAVACHOL) 40 mg tablet, Take 1 tablet (40 mg total) by mouth daily, Disp: 90 tablet, Rfl: 1    Tiadylt  MG 24 hr capsule, Take 360 mg by mouth every morning, Disp: , Rfl:     aspirin (ECOTRIN LOW STRENGTH) 81 mg EC tablet, Take 81 mg by mouth daily (Patient not taking: Reported on 7/18/2024), Disp: , Rfl:     diltiazem (CARDIZEM CD) 240 mg 24 hr capsule, Take 240 mg by mouth daily (Patient not taking: Reported on 7/2/2024), Disp: , Rfl:     diltiazem (CARDIZEM CD) 360 MG 24 hr capsule, Take 360 mg by mouth daily (Patient not taking: Reported on 7/18/2024), Disp: , Rfl:      diltiazem (DILACOR XR) 180 MG 24 hr capsule, Take 180 mg by mouth daily (Patient not taking: Reported on 1/18/2024), Disp: , Rfl:     hydrochlorothiazide (MICROZIDE) 12.5 mg capsule, Take 12.5 mg by mouth daily (Patient not taking: Reported on 7/2/2024), Disp: , Rfl:     ketoconazole (NIZORAL) 2 % shampoo, Apply topically (Patient not taking: Reported on 9/6/2024), Disp: , Rfl:     OLANZapine (ZyPREXA) 5 mg tablet, Take 5 mg by mouth, Disp: , Rfl:     Current Allergies     Allergies as of 09/06/2024 - Reviewed 09/06/2024   Allergen Reaction Noted    Amoxicillin Rash 01/25/2022    Metoprolol Other (See Comments) 02/06/2023    Shellfish allergy - food allergy Angioedema and Anaphylaxis 01/09/2015    Lisinopril Cough 01/10/2023    Paroxetine Other (See Comments) 07/03/2024    Other Rash 07/13/2023            The following portions of the patient's history were reviewed and updated as appropriate: allergies, current medications, past family history, past medical history, past social history, past surgical history and problem list.     Past Medical History:   Diagnosis Date    Chronic kidney disease     Emphysema of lung (HCC)     Hyperlipemia     Hyperlipemia     Hypertension     Kidney disease     Myeloma (HCC)     Osteopenia     Rheumatoid arthritis (HCC)        Past Surgical History:   Procedure Laterality Date    EYE SURGERY      myeloma in corner of eye       Family History   Problem Relation Age of Onset    Hypertension Mother     Heart disease Mother     Heart disease Father     Hypertension Father     Heart attack Brother          Medications have been verified.        Objective   /81   Pulse 86   Temp 98.3 °F (36.8 °C)   Resp 20   SpO2 96%        Physical Exam     Physical Exam  Vitals and nursing note reviewed.   Constitutional:       Appearance: Normal appearance.   HENT:      Head: Normocephalic and atraumatic.   Cardiovascular:      Rate and Rhythm: Normal rate and regular rhythm.      Pulses:  Normal pulses.      Heart sounds: Normal heart sounds.   Pulmonary:      Effort: Pulmonary effort is normal.      Breath sounds: Normal breath sounds.   Abdominal:      General: Abdomen is flat. Bowel sounds are normal.      Palpations: Abdomen is soft.      Tenderness: There is right CVA tenderness. There is no left CVA tenderness.   Musculoskeletal:         General: Normal range of motion.      Cervical back: Normal range of motion and neck supple.   Skin:     General: Skin is warm and dry.      Capillary Refill: Capillary refill takes less than 2 seconds.   Neurological:      General: No focal deficit present.      Mental Status: She is alert and oriented to person, place, and time.

## 2024-09-08 LAB — BACTERIA UR CULT: ABNORMAL

## 2024-10-25 ENCOUNTER — OFFICE VISIT (OUTPATIENT)
Dept: URGENT CARE | Facility: CLINIC | Age: 64
End: 2024-10-25
Payer: MEDICARE

## 2024-10-25 VITALS
OXYGEN SATURATION: 97 % | TEMPERATURE: 98.6 F | DIASTOLIC BLOOD PRESSURE: 86 MMHG | HEIGHT: 64 IN | BODY MASS INDEX: 24.03 KG/M2 | SYSTOLIC BLOOD PRESSURE: 144 MMHG | HEART RATE: 74 BPM | RESPIRATION RATE: 18 BRPM

## 2024-10-25 DIAGNOSIS — R39.9 UTI SYMPTOMS: Primary | ICD-10-CM

## 2024-10-25 LAB
SL AMB  POCT GLUCOSE, UA: ABNORMAL
SL AMB LEUKOCYTE ESTERASE,UA: ABNORMAL
SL AMB POCT BILIRUBIN,UA: ABNORMAL
SL AMB POCT BLOOD,UA: ABNORMAL
SL AMB POCT CLARITY,UA: CLEAR
SL AMB POCT COLOR,UA: ABNORMAL
SL AMB POCT KETONES,UA: ABNORMAL
SL AMB POCT NITRITE,UA: ABNORMAL
SL AMB POCT PH,UA: 5
SL AMB POCT SPECIFIC GRAVITY,UA: 1.02
SL AMB POCT URINE PROTEIN: 2000
SL AMB POCT UROBILINOGEN: 0.2

## 2024-10-25 PROCEDURE — 99213 OFFICE O/P EST LOW 20 MIN: CPT

## 2024-10-25 PROCEDURE — G0463 HOSPITAL OUTPT CLINIC VISIT: HCPCS

## 2024-10-25 PROCEDURE — 81002 URINALYSIS NONAUTO W/O SCOPE: CPT

## 2024-10-25 PROCEDURE — 87086 URINE CULTURE/COLONY COUNT: CPT

## 2024-10-25 RX ORDER — DOXYCYCLINE 100 MG/1
100 TABLET ORAL 2 TIMES DAILY
Qty: 10 TABLET | Refills: 0 | Status: SHIPPED | OUTPATIENT
Start: 2024-10-25 | End: 2024-10-30

## 2024-10-25 NOTE — PROGRESS NOTES
[facility]        NAME: Corry May is a 64 y.o. female  : 1960    MRN: [id number]  DATE: 2024  TIME: 2:12 PM    Assessment and Plan   UTI symptoms [R39.9]  1. UTI symptoms  POCT urine dip    Urine culture    doxycycline (ADOXA) 100 MG tablet        Burning with urination x 1 week.  Does have stage IV CKD.  Urine dip today showing large blood.  Negative nitrite and leukocyte.  Trace ketones.  2000 protein.  No acute distress.  Vitals within normal limits.  Lungs clear to auscultation without wheezing rales or rhonchi.  Patient's last urine culture was positive for Enterococcus faecalis.  Will treat with doxycycline at this time (as tetracycline susceptible).  Recommended family doctor/GYN follow-up if no improvement.  Advised to go to the ER if any symptoms worsen.    Patient Instructions     Take prescribed medication as instructed.  Tylenol for pain or fever.  Make sure to stay well-hydrated.  Cranberry tablet 500 mg over-the-counter daily.  Follow-up with family doctor or gynecology if no improvement  Follow up with PCP in 3-5 days.  Proceed to  ER if symptoms worsen.    If tests are performed, our office will contact you with results only if changes need to made to the care plan discussed with you at the visit. You can review your full results on [facility].    Chief Complaint     Chief Complaint   Patient presents with    Possible UTI     Started a week ago  Burning upon urination         History of Present Illness       64-year-old female with past medical history of severe dementia, stage IV CKD, hypertension presents to the clinic with 1 week of burning with urination.  Patient was seen here in July as well as September of this year.  First urine culture was negative.  Second urine culture was positive for Enterococcus faecalis.  She was treated with antibiotic at that time in September of this year.  She denies any fever, chills, chest pain, shortness of breath  abdominal pain, flank pain.        Review of Systems   Review of Systems   Constitutional: Negative.    HENT: Negative.     Respiratory: Negative.     Cardiovascular: Negative.    Gastrointestinal: Negative.    Genitourinary:  Positive for dysuria. Negative for flank pain, hematuria, pelvic pain and urgency.   Musculoskeletal: Negative.    Neurological: Negative.          Current Medications       Current Outpatient Medications:     albuterol (PROVENTIL HFA,VENTOLIN HFA) 90 mcg/act inhaler, Inhale 2 puffs every 4 (four) hours as needed for wheezing, Disp: 18 g, Rfl: 1    alendronate (FOSAMAX) 70 mg tablet, TAKE 1 TABLET BY MOUTH EVERY 7 DAYS IN THE MORNING ON AN EMPTY STOMACH WITH A FULL GLASS OF WATER, Disp: , Rfl:     diltiazem (CARDIZEM CD) 240 mg 24 hr capsule, Take 240 mg by mouth daily, Disp: , Rfl:     diltiazem (DILACOR XR) 180 MG 24 hr capsule, Take 180 mg by mouth daily, Disp: , Rfl:     doxycycline (ADOXA) 100 MG tablet, Take 1 tablet (100 mg total) by mouth 2 (two) times a day for 5 days, Disp: 10 tablet, Rfl: 0    escitalopram (Lexapro) 10 mg tablet, Take 1 tablet (10 mg total) by mouth daily, Disp: 90 tablet, Rfl: 1    famotidine (PEPCID) 20 mg tablet, Take 20 mg by mouth daily, Disp: , Rfl:     hydrochlorothiazide (MICROZIDE) 12.5 mg capsule, Take 12.5 mg by mouth daily, Disp: , Rfl:     ketoconazole (NIZORAL) 2 % shampoo, Apply topically, Disp: , Rfl:     levalbuterol (XOPENEX HFA) 45 mcg/act inhaler, INHALE 2 PUFFS BY MOUTH AND INTO THE LUNGS EVERY 4 HOURS IF NEEDED, Disp: , Rfl:     loratadine (CLARITIN) 10 mg tablet, Take 10 mg by mouth daily, Disp: , Rfl:     losartan (COZAAR) 100 MG tablet, Take 100 mg by mouth every morning, Disp: , Rfl:     Multiple Vitamin (Multi-Vitamin) tablet, Take 1 tablet by mouth daily, Disp: , Rfl:     PARoxetine (PAXIL) 20 mg tablet, , Disp: , Rfl:     potassium chloride (Klor-Con M10) 10 mEq tablet, Take 10 mEq by mouth daily, Disp: , Rfl:     pravastatin (PRAVACHOL)  "40 mg tablet, Take 1 tablet (40 mg total) by mouth daily, Disp: 90 tablet, Rfl: 1    Tiadylt  MG 24 hr capsule, Take 360 mg by mouth every morning, Disp: , Rfl:     aspirin (ECOTRIN LOW STRENGTH) 81 mg EC tablet, Take 81 mg by mouth daily (Patient not taking: Reported on 7/18/2024), Disp: , Rfl:     diltiazem (CARDIZEM CD) 360 MG 24 hr capsule, Take 360 mg by mouth daily (Patient not taking: Reported on 7/18/2024), Disp: , Rfl:     OLANZapine (ZyPREXA) 5 mg tablet, Take 5 mg by mouth, Disp: , Rfl:     Current Allergies     Allergies as of 10/25/2024 - Reviewed 09/06/2024   Allergen Reaction Noted    Amoxicillin Rash 01/25/2022    Metoprolol Other (See Comments) 02/06/2023    Shellfish allergy - food allergy Angioedema and Anaphylaxis 01/09/2015    Lisinopril Cough 01/10/2023    Paroxetine Other (See Comments) 07/03/2024    Other Rash 07/13/2023            The following portions of the patient's history were reviewed and updated as appropriate: allergies, current medications, past family history, past medical history, past social history, past surgical history and problem list.     Past Medical History:   Diagnosis Date    Chronic kidney disease     Emphysema of lung (HCC)     Hyperlipemia     Hyperlipemia     Hypertension     Kidney disease     Myeloma (HCC)     Osteopenia     Rheumatoid arthritis (HCC)        Past Surgical History:   Procedure Laterality Date    EYE SURGERY      myeloma in corner of eye       Family History   Problem Relation Age of Onset    Hypertension Mother     Heart disease Mother     Heart disease Father     Hypertension Father     Heart attack Brother          Medications have been verified.        Objective   /86   Pulse 74   Temp 98.6 °F (37 °C)   Resp 18   Ht 5' 4\" (1.626 m)   SpO2 97%   BMI 24.03 kg/m²        Physical Exam     Physical Exam  Constitutional:       General: She is not in acute distress.     Appearance: Normal appearance. She is not ill-appearing, " toxic-appearing or diaphoretic.   HENT:      Head: Normocephalic and atraumatic.      Right Ear: Tympanic membrane, ear canal and external ear normal.      Left Ear: Tympanic membrane, ear canal and external ear normal.      Mouth/Throat:      Mouth: Mucous membranes are moist.      Pharynx: Oropharynx is clear.   Eyes:      Extraocular Movements: Extraocular movements intact.      Pupils: Pupils are equal, round, and reactive to light.   Cardiovascular:      Rate and Rhythm: Normal rate and regular rhythm.      Pulses: Normal pulses.      Heart sounds: Normal heart sounds. No murmur heard.     No friction rub. No gallop.   Pulmonary:      Effort: Pulmonary effort is normal. No respiratory distress.      Breath sounds: Normal breath sounds. No stridor. No wheezing, rhonchi or rales.   Chest:      Chest wall: No tenderness.   Abdominal:      General: Abdomen is flat. Bowel sounds are normal. There is no distension.      Palpations: Abdomen is soft. There is no mass.      Tenderness: There is no abdominal tenderness. There is no right CVA tenderness, left CVA tenderness, guarding or rebound.   Skin:     General: Skin is warm and dry.      Capillary Refill: Capillary refill takes less than 2 seconds.      Coloration: Skin is not pale.      Findings: No bruising, erythema or rash.   Neurological:      Mental Status: She is alert. Mental status is at baseline.   Psychiatric:         Mood and Affect: Mood normal.         Behavior: Behavior normal.

## 2024-10-25 NOTE — PATIENT INSTRUCTIONS
"Take prescribed medication as instructed.  Tylenol for pain or fever.  Make sure to stay well-hydrated.  Cranberry tablet 500 mg over-the-counter daily.  Follow-up with family doctor or gynecology if no improvement  Follow up with PCP in 3-5 days.  Proceed to  ER if symptoms worsen.    If tests are performed, our office will contact you with results only if changes need to made to the care plan discussed with you at the visit. You can review your full results on [facility].  Patient Education     Urinary tract infection - Discharge instructions   The Basics   Written by the doctors and editors at [facility]   What are discharge instructions? -- Discharge instructions are information about how to take care of yourself after getting medical care for a health problem.  What is a urinary tract infection? -- A urinary tract infection (\"UTI\") is an infection that affects either the bladder or the kidneys (figure 1). A kidney infection is more serious, and can lead to other serious problems if it is not treated properly.  You need to take antibiotics to treat a UTI. It is important to take all of your antibiotics, even if you start to feel better.  How do I care for myself at home? -- Ask the doctor or nurse what you should do when you go home. Make sure that you understand exactly what you need to do to care for yourself. Ask questions if there is anything you do not understand.  You should also:   Take all of your medicines as instructed.   Take phenazopyridine (sample brand name: AZO Urinary Pain Relief) for the first day or so, if you choose. This is an over-the-counter medicine. It will help numb your bladder and decrease the urge to urinate. This medicine causes your urine and tears to look orange.   Take acetaminophen (sample brand name: Tylenol) if needed for pain.   Drink extra fluids. This can help prevent more bladder infections. If you have sex, these things might also help:   Urinate right afterward.   " If you use birth control, use a form that does not contain spermicide.  When should I call the doctor? -- Call for advice if:   You have pain in your back, shoulder, or belly.   You have a fever, shaking chills, or sweats even though you are taking antibiotics.   You notice more blood in your urine.   Your symptoms get worse or do not get better within 24 hours of starting antibiotics.   Your symptoms come back after finishing treatment.   You have any new or worrying symptoms.  All topics are updated as new evidence becomes available and our peer review process is complete.  This topic retrieved from [employer] on: Feb 26, 2024.  Topic 725347 Version 1.0  Release: 32.2.4 - C32.56  © 2024 UpToDate, Inc. and/or its affiliates. All rights reserved.  figure 1: Anatomy of the urinary tract     Urine is made by the kidneys. It passes from the kidneys into the bladder through 2 tubes called the ureters. Then, it leaves the bladder through another tube called the urethra.  Graphic 14524 Version 8.0  Consumer Information Use and Disclaimer   Disclaimer: This generalized information is a limited summary of diagnosis, treatment, and/or medication information. It is not meant to be comprehensive and should be used as a tool to help the user understand and/or assess potential diagnostic and treatment options. It does NOT include all information about conditions, treatments, medications, side effects, or risks that may apply to a specific patient. It is not intended to be medical advice or a substitute for the medical advice, diagnosis, or treatment of a health care provider based on the health care provider's examination and assessment of a patient's specific and unique circumstances. Patients must speak with a health care provider for complete information about their health, medical questions, and treatment options, including any risks or benefits regarding use of medications. This information does not endorse any treatments or  medications as safe, effective, or approved for treating a specific patient. UpToDate, Inc. and its affiliates disclaim any warranty or liability relating to this information or the use thereof.The use of this information is governed by the Terms of Use, available at https://www.[employer].com/en/know/clinical-effectiveness-terms. 2024© UpToDate, Inc. and its affiliates and/or licensors. All rights reserved.  Copyright   © 2024 Up[employer]Date, Inc. and/or its affiliates. All rights reserved.

## 2024-10-26 LAB — BACTERIA UR CULT: NORMAL

## 2025-05-16 ENCOUNTER — APPOINTMENT (OUTPATIENT)
Dept: URGENT CARE | Facility: CLINIC | Age: 65
End: 2025-05-16
Payer: MEDICARE

## 2025-05-16 ENCOUNTER — OFFICE VISIT (OUTPATIENT)
Dept: URGENT CARE | Facility: CLINIC | Age: 65
End: 2025-05-16
Payer: MEDICARE

## 2025-05-16 VITALS
RESPIRATION RATE: 22 BRPM | DIASTOLIC BLOOD PRESSURE: 76 MMHG | TEMPERATURE: 98.3 F | WEIGHT: 156.2 LBS | OXYGEN SATURATION: 96 % | BODY MASS INDEX: 26.81 KG/M2 | HEART RATE: 82 BPM | SYSTOLIC BLOOD PRESSURE: 132 MMHG

## 2025-05-16 DIAGNOSIS — R14.2 BELCHING: Primary | ICD-10-CM

## 2025-05-16 PROCEDURE — 99213 OFFICE O/P EST LOW 20 MIN: CPT

## 2025-05-16 PROCEDURE — G0463 HOSPITAL OUTPT CLINIC VISIT: HCPCS

## 2025-05-16 RX ORDER — PANTOPRAZOLE SODIUM 20 MG/1
20 TABLET, DELAYED RELEASE ORAL DAILY
Qty: 15 TABLET | Refills: 0 | Status: SHIPPED | OUTPATIENT
Start: 2025-05-16 | End: 2025-05-31

## 2025-05-16 NOTE — PATIENT INSTRUCTIONS
Take your Protonix as ordered daily  Take famotidine (pepcid) daily   Take Gas-X or Mylanta gas as directed on packaging to assist with alleviating the belching    Eat small frequent meals and drink slowly  Avoid smoking  Avoid carbonated beverages  Stay away from chewing gum and hard candy   Avoid gas producing foods such as cauliflower, broccoli, cabbage, beans, and bran.  Limit your dairy intake as sometimes lactose containing foods cause increased gas production

## 2025-05-16 NOTE — PROGRESS NOTES
[facility]        NAME: Corry May is a 65 y.o. female  : 1960    MRN: [id number]  DATE: May 16, 2025  TIME: 4:55 PM    Assessment and Plan   Belching [R14.2]  1. Belching          Patient has a documented history of GERD for which he was taking Protonix and Pepcid.  She reports she does not think she is taking these medications anymore although they are listed on an active medication list.  Informed patient she should restart Protonix and Pepcid daily, take Gas-X or Mylanta gas to assist with alleviating the gas and belching, eat small frequent meals and drink slowly, avoid smoking, avoid carbonated beverages, stay away from chewing gum and hard candy, avoid gas producing foods, and limit your dairy intake.  If symptoms persist advised patient to follow-up with GI specialist or family doctor for a reevaluation.  Discussed red flag symptoms and when to present to the emergency room (if you develop fever, chills, abdominal pain).    Patient Instructions       Follow up with PCP in 3-5 days.  Proceed to  ER if symptoms worsen.    If tests are performed, our office will contact you with results only if changes need to made to the care plan discussed with you at the visit. You can review your full results on [facility].    Chief Complaint     Chief Complaint   Patient presents with    burping     X1 week. Pt reports taking OTC meds with no relief. Denies chest pain, SOB, or heartburn.          History of Present Illness       65-year-old female with significant past medical history presents to this clinic with complaint of burping excessively for the past 1 week.  Patient reports she is taking over-the-counter medications with no relief.  She denies chest pain, shortness of breath, or heartburn.      Review of Systems   Review of Systems   Respiratory:  Negative for shortness of breath.    Cardiovascular:  Negative for chest pain.   Gastrointestinal:  Negative for nausea and vomiting.         Belching excessively x1 week         Current Medications     Current Medications[1]    Current Allergies     Allergies as of 05/16/2025 - Reviewed 05/16/2025   Allergen Reaction Noted    Amoxicillin Rash 01/25/2022    Metoprolol Other (See Comments) 02/06/2023    Shellfish allergy - food allergy Angioedema and Anaphylaxis 01/09/2015    Lisinopril Cough 01/10/2023    Paroxetine Other (See Comments) 07/03/2024    Other Rash 07/13/2023            The following portions of the patient's history were reviewed and updated as appropriate: allergies, current medications, past family history, past medical history, past social history, past surgical history and problem list.     Past Medical History:   Diagnosis Date    Chronic kidney disease     Emphysema of lung (HCC)     Hyperlipemia     Hyperlipemia     Hypertension     Kidney disease     Myeloma (HCC)     Osteopenia     Rheumatoid arthritis (HCC)        Past Surgical History:   Procedure Laterality Date    EYE SURGERY      myeloma in corner of eye       Family History   Problem Relation Age of Onset    Hypertension Mother     Heart disease Mother     Heart disease Father     Hypertension Father     Heart attack Brother          Medications have been verified.        Objective   /76 (BP Location: Left arm, Patient Position: Sitting, Cuff Size: Adult)   Pulse 82   Temp 98.3 °F (36.8 °C) (Tympanic)   Resp 22   Wt 70.9 kg (156 lb 3.2 oz)   SpO2 96%   BMI 26.81 kg/m²        Physical Exam     Physical Exam  Vitals and nursing note reviewed.   Constitutional:       General: She is not in acute distress.     Appearance: Normal appearance. She is obese. She is not ill-appearing.   HENT:      Head: Normocephalic and atraumatic.     Eyes:      Extraocular Movements: Extraocular movements intact.      Conjunctiva/sclera: Conjunctivae normal.      Pupils: Pupils are equal, round, and reactive to light.       Cardiovascular:      Rate and Rhythm: Normal rate and regular  rhythm.      Pulses: Normal pulses.      Heart sounds: Normal heart sounds.   Pulmonary:      Effort: Pulmonary effort is normal.      Breath sounds: Normal breath sounds.   Abdominal:      General: Abdomen is protuberant. Bowel sounds are normal.      Palpations: Abdomen is soft.      Tenderness: There is no abdominal tenderness.      Hernia: No hernia is present.      Comments: Frequent belching during examination     Musculoskeletal:         General: Normal range of motion.      Cervical back: Normal range of motion and neck supple.     Skin:     General: Skin is warm and dry.      Capillary Refill: Capillary refill takes less than 2 seconds.     Neurological:      General: No focal deficit present.      Mental Status: She is alert and oriented to person, place, and time.     Psychiatric:         Attention and Perception: Attention and perception normal.         Mood and Affect: Affect normal. Mood is anxious.         Speech: Speech normal.         Behavior: Behavior normal.         Thought Content: Thought content normal.         Cognition and Memory: Cognition and memory normal.         Judgment: Judgment normal.                        [1]   Current Outpatient Medications:     albuterol (PROVENTIL HFA,VENTOLIN HFA) 90 mcg/act inhaler, Inhale 2 puffs every 4 (four) hours as needed for wheezing, Disp: 18 g, Rfl: 1    alendronate (FOSAMAX) 70 mg tablet, , Disp: , Rfl:     diltiazem (CARDIZEM CD) 240 mg 24 hr capsule, Take 240 mg by mouth in the morning., Disp: , Rfl:     escitalopram (Lexapro) 10 mg tablet, Take 1 tablet (10 mg total) by mouth daily, Disp: 90 tablet, Rfl: 1    famotidine (PEPCID) 20 mg tablet, Take 20 mg by mouth in the morning., Disp: , Rfl:     hydrochlorothiazide (MICROZIDE) 12.5 mg capsule, Take 12.5 mg by mouth in the morning., Disp: , Rfl:     ketoconazole (NIZORAL) 2 % shampoo, Apply topically, Disp: , Rfl:     levalbuterol (XOPENEX HFA) 45 mcg/act inhaler, , Disp: , Rfl:     loratadine  (CLARITIN) 10 mg tablet, Take 10 mg by mouth in the morning., Disp: , Rfl:     losartan (COZAAR) 100 MG tablet, Take 100 mg by mouth every morning, Disp: , Rfl:     Multiple Vitamin (Multi-Vitamin) tablet, Take 1 tablet by mouth in the morning., Disp: , Rfl:     PARoxetine (PAXIL) 20 mg tablet, , Disp: , Rfl:     pravastatin (PRAVACHOL) 40 mg tablet, Take 1 tablet (40 mg total) by mouth daily, Disp: 90 tablet, Rfl: 1    Tiadylt  MG 24 hr capsule, Take 360 mg by mouth every morning, Disp: , Rfl:     aspirin (ECOTRIN LOW STRENGTH) 81 mg EC tablet, Take 81 mg by mouth daily (Patient not taking: Reported on 7/18/2024), Disp: , Rfl:     diltiazem (CARDIZEM CD) 360 MG 24 hr capsule, Take 360 mg by mouth daily (Patient not taking: Reported on 5/16/2025), Disp: , Rfl:     diltiazem (DILACOR XR) 180 MG 24 hr capsule, Take 180 mg by mouth daily, Disp: , Rfl:     OLANZapine (ZyPREXA) 5 mg tablet, Take 5 mg by mouth, Disp: , Rfl:     potassium chloride (Klor-Con M10) 10 mEq tablet, Take 10 mEq by mouth daily, Disp: , Rfl: